# Patient Record
Sex: MALE | Race: BLACK OR AFRICAN AMERICAN | URBAN - METROPOLITAN AREA
[De-identification: names, ages, dates, MRNs, and addresses within clinical notes are randomized per-mention and may not be internally consistent; named-entity substitution may affect disease eponyms.]

---

## 2021-01-21 ENCOUNTER — ANESTHESIA EVENT (OUTPATIENT)
Dept: OPERATING ROOM | Age: 56
End: 2021-01-21
Payer: COMMERCIAL

## 2021-01-21 NOTE — PROGRESS NOTES
Unable to reach pt or leave preop inst despite multiple attempts. Vmsg left with Dr. Arsen Jovel' office informing them of this matter as well as not having any covid results on pt's chart.

## 2021-01-21 NOTE — H&P
[facility]   [facility]ISTORY AND PHYSICAL      I am seeing Eugene Bell at the request of Dr Jr Castano  for evaluation of the patient's medical problems prior to left tailor bunionectomy with fifth metatarsal osteotomy left     1/21/2021 6:11 PM    Patient Information:  Eugene Bell is a 48 y.o. male [id number]  PCP:  Mauricio Christensen (Tel: None     Chief complaint:  I am having foot surgery on my baby toe    History of Present Illness:  Facundo Oden is a 48 y.o. male who presented with foot pain. . Symptom onset was gradual for a time period of several months. The severity is described as moderate. The course of his symptoms over time is recurrent. The symptoms improved with non weight bearing and worsened with weight bearing. The patient's symptom is associated with a bunion. History obtained from patient and record review       REVIEW OF SYSTEMS:   Constitutional:  Negative for fever,chills or night sweats  ENT:  Negative for rhinorrhea, epistaxis, hoarseness, sore throat. Respiratory:   Negative for shortness of breath,wheezing  Cardiovascular:   Negative for  chest pain, palpitations   Gastrointestinal:  Negative for nausea, vomiting, diarrhea  Genitourinary:  Negative for polyuria, dysuria   Hematologic/Lymphatic:  Negative for  bleeding tendency, easy bruising  Musculoskeletal:  Negative for myalgias and arthralgias  Neurologic:  Negative for  confusion,dysarthria. Skin:  Negative for itching,rash  Psychiatric:  Negative for depression,anxiety, agitation. Endocrine:  Negative for polydipsia,polyuria,heat /cold intolerance. Past Medical History:  T2DM  HTN    Past Surgical History:   has no past surgical history on file. Medications:  No current facility-administered medications on file prior to encounter.       Current Outpatient Medications on File Prior to Encounter   Medication Sig Dispense Refill    naproxen (NAPROSYN) 375 MG tablet Take 1 tablet by mouth 2 times daily 12 tablet 0    metFORMIN (GLUCOPHAGE) 1000 MG tablet Take 1,000 mg by mouth 2 times daily (with meals)      aspirin 81 MG tablet Take 81 mg by mouth daily      atorvastatin (LIPITOR) 40 MG tablet Take 40 mg by mouth every evening      lisinopril-hydrochlorothiazide (PRINZIDE;ZESTORETIC) 10-12.5 MG per tablet Take 1 tablet by mouth daily      pioglitazone (ACTOS) 15 MG tablet Take 15 mg by mouth daily         Allergies:  No Known Allergies     Social History:   reports that he has never smoked. He has never used smokeless tobacco. He reports current alcohol use. He reports that he does not use drugs. Family History: Mom with HTN and T2DM  Dad with T2DM    Physical Exam:  BP (!) 148/93   Pulse 65   Temp 97.7 °F (36.5 °C) (Temporal)   Resp 16   Ht 5' 6\" (1.676 m)   Wt 197 lb (89.4 kg)   SpO2 98%   BMI 31.80 kg/m²     General appearance:  Appears comfortable. Well nourished  Eyes: Sclera clear, pupils equal  ENT: Moist mucus membranes, no thrush. Trachea midline. Cardiovascular: Regular rhythm, normal S1, S2. No murmur, gallop, rub. No edema in lower extremities  Respiratory: Clear to auscultation bilaterally, no wheeze, good inspiratory effort  Gastrointestinal: Abdomen soft, non-tender, not distended, normal bowel sounds  Musculoskeletal: No cyanosis in digits, neck supple  Neurology: Cranial nerves grossly intact. Alert and oriented in time, place and person. No speech or motor deficits  Psychiatry: Appropriate affect.  Not agitated  Skin: Warm, dry, normal turgor, no rash    Labs:  CBC:   Lab Results   Component Value Date    WBC 5.8 10/23/2017    RBC 5.09 10/23/2017    HGB 15.7 10/23/2017    HCT 47.4 10/23/2017    MCV 93.0 10/23/2017    MCH 30.8 10/23/2017    MCHC 33.1 10/23/2017    RDW 14.7 10/23/2017     10/23/2017    MPV 7.4 10/23/2017     BMP:    Lab Results   Component Value Date     10/23/2017    K 4.2 10/23/2017     10/23/2017    CO2 24 10/23/2017    BUN 14 10/23/2017    CREATININE 0.8 10/23/2017    CALCIUM 9.3 10/23/2017    GFRAA >60 10/23/2017    LABGLOM >60 10/23/2017    GLUCOSE 93 10/23/2017           Problem List  Bunion  HTN  T2DM      Assessment & Recommendation:     1. Bunion:  For surgery today    2. T2DM:  BG this am is 111    3. HTN:  bp slightly elevated. On ACE/ HCTZ         Patient is medically optimized for surgery. Thank you for the opportunity to participate in the care of your patient.   ARIADNE Weston - CNP    1/22/2021 7:08 AM

## 2021-01-22 ENCOUNTER — ANESTHESIA (OUTPATIENT)
Dept: OPERATING ROOM | Age: 56
End: 2021-01-22
Payer: COMMERCIAL

## 2021-01-22 ENCOUNTER — APPOINTMENT (OUTPATIENT)
Dept: GENERAL RADIOLOGY | Age: 56
End: 2021-01-22
Attending: PODIATRIST
Payer: COMMERCIAL

## 2021-01-22 ENCOUNTER — HOSPITAL ENCOUNTER (OUTPATIENT)
Age: 56
Setting detail: OUTPATIENT SURGERY
Discharge: HOME | End: 2021-01-22
Attending: PODIATRIST | Admitting: PODIATRIST
Payer: COMMERCIAL

## 2021-01-22 VITALS
HEART RATE: 64 BPM | OXYGEN SATURATION: 100 % | WEIGHT: 197 LBS | DIASTOLIC BLOOD PRESSURE: 106 MMHG | TEMPERATURE: 97 F | HEIGHT: 66 IN | RESPIRATION RATE: 16 BRPM | SYSTOLIC BLOOD PRESSURE: 146 MMHG | BODY MASS INDEX: 31.66 KG/M2

## 2021-01-22 VITALS
DIASTOLIC BLOOD PRESSURE: 69 MMHG | RESPIRATION RATE: 11 BRPM | SYSTOLIC BLOOD PRESSURE: 118 MMHG | OXYGEN SATURATION: 100 %

## 2021-01-22 LAB
GLUCOSE BLD-MCNC: 111 MG/DL (ref 70–99)
GLUCOSE BLD-MCNC: 96 MG/DL (ref 70–99)
PERFORMED ON: ABNORMAL
PERFORMED ON: NORMAL

## 2021-01-22 PROCEDURE — 2500000003 HC RX 250 WO HCPCS: Performed by: NURSE ANESTHETIST, CERTIFIED REGISTERED

## 2021-01-22 PROCEDURE — 2580000003 HC RX 258: Performed by: PODIATRIST

## 2021-01-22 PROCEDURE — 7100000010 HC PHASE II RECOVERY - FIRST 15 MIN: Performed by: PODIATRIST

## 2021-01-22 PROCEDURE — 3600000014 HC SURGERY LEVEL 4 ADDTL 15MIN: Performed by: PODIATRIST

## 2021-01-22 PROCEDURE — C1713 ANCHOR/SCREW BN/BN,TIS/BN: HCPCS | Performed by: PODIATRIST

## 2021-01-22 PROCEDURE — 6360000002 HC RX W HCPCS: Performed by: NURSE ANESTHETIST, CERTIFIED REGISTERED

## 2021-01-22 PROCEDURE — 3700000001 HC ADD 15 MINUTES (ANESTHESIA): Performed by: PODIATRIST

## 2021-01-22 PROCEDURE — 6360000002 HC RX W HCPCS: Performed by: PODIATRIST

## 2021-01-22 PROCEDURE — 2709999900 HC NON-CHARGEABLE SUPPLY: Performed by: PODIATRIST

## 2021-01-22 PROCEDURE — 3600000004 HC SURGERY LEVEL 4 BASE: Performed by: PODIATRIST

## 2021-01-22 PROCEDURE — 73620 X-RAY EXAM OF FOOT: CPT

## 2021-01-22 PROCEDURE — 2500000003 HC RX 250 WO HCPCS: Performed by: PODIATRIST

## 2021-01-22 PROCEDURE — 7100000000 HC PACU RECOVERY - FIRST 15 MIN: Performed by: PODIATRIST

## 2021-01-22 PROCEDURE — C9359 IMPLNT,BON VOID FILLER-PUTTY: HCPCS | Performed by: PODIATRIST

## 2021-01-22 PROCEDURE — 2580000003 HC RX 258: Performed by: NURSE ANESTHETIST, CERTIFIED REGISTERED

## 2021-01-22 PROCEDURE — 3700000000 HC ANESTHESIA ATTENDED CARE: Performed by: PODIATRIST

## 2021-01-22 PROCEDURE — 2720000010 HC SURG SUPPLY STERILE: Performed by: PODIATRIST

## 2021-01-22 PROCEDURE — 7100000001 HC PACU RECOVERY - ADDTL 15 MIN: Performed by: PODIATRIST

## 2021-01-22 PROCEDURE — 7100000011 HC PHASE II RECOVERY - ADDTL 15 MIN: Performed by: PODIATRIST

## 2021-01-22 DEVICE — CANNULATED SCREW
Type: IMPLANTABLE DEVICE | Site: FOOT | Status: FUNCTIONAL
Brand: ASNIS

## 2021-01-22 DEVICE — SCREW LK T6 2.0MM L14MM: Type: IMPLANTABLE DEVICE | Site: FOOT | Status: FUNCTIONAL

## 2021-01-22 DEVICE — SCREW BONE LK T6 2.0MM L12MM: Type: IMPLANTABLE DEVICE | Site: FOOT | Status: FUNCTIONAL

## 2021-01-22 DEVICE — GRAFT BNE 5ML DEMIN BNE MTRX PTY INTERGRO: Type: IMPLANTABLE DEVICE | Site: FOOT | Status: FUNCTIONAL

## 2021-01-22 DEVICE — ALLOGRAFT HUM TISS INJ LG 1 CC STRL ALLOGEN AMINON: Type: IMPLANTABLE DEVICE | Site: FOOT | Status: FUNCTIONAL

## 2021-01-22 RX ORDER — SODIUM CHLORIDE 9 MG/ML
INJECTION, SOLUTION INTRAVENOUS CONTINUOUS
Status: DISCONTINUED | OUTPATIENT
Start: 2021-01-22 | End: 2021-01-22 | Stop reason: HOSPADM

## 2021-01-22 RX ORDER — SODIUM CHLORIDE, SODIUM LACTATE, POTASSIUM CHLORIDE, CALCIUM CHLORIDE 600; 310; 30; 20 MG/100ML; MG/100ML; MG/100ML; MG/100ML
INJECTION, SOLUTION INTRAVENOUS CONTINUOUS
Status: DISCONTINUED | OUTPATIENT
Start: 2021-01-22 | End: 2021-01-22 | Stop reason: HOSPADM

## 2021-01-22 RX ORDER — PROPOFOL 10 MG/ML
INJECTION, EMULSION INTRAVENOUS PRN
Status: DISCONTINUED | OUTPATIENT
Start: 2021-01-22 | End: 2021-01-22 | Stop reason: SDUPTHER

## 2021-01-22 RX ORDER — FENTANYL CITRATE 50 UG/ML
INJECTION, SOLUTION INTRAMUSCULAR; INTRAVENOUS PRN
Status: DISCONTINUED | OUTPATIENT
Start: 2021-01-22 | End: 2021-01-22 | Stop reason: SDUPTHER

## 2021-01-22 RX ORDER — SODIUM CHLORIDE 0.9 % (FLUSH) 0.9 %
10 SYRINGE (ML) INJECTION EVERY 12 HOURS SCHEDULED
Status: DISCONTINUED | OUTPATIENT
Start: 2021-01-22 | End: 2021-01-22 | Stop reason: HOSPADM

## 2021-01-22 RX ORDER — ONDANSETRON 2 MG/ML
4 INJECTION INTRAMUSCULAR; INTRAVENOUS
Status: DISCONTINUED | OUTPATIENT
Start: 2021-01-22 | End: 2021-01-22 | Stop reason: HOSPADM

## 2021-01-22 RX ORDER — LIDOCAINE HYDROCHLORIDE 10 MG/ML
1 INJECTION, SOLUTION EPIDURAL; INFILTRATION; INTRACAUDAL; PERINEURAL
Status: DISCONTINUED | OUTPATIENT
Start: 2021-01-22 | End: 2021-01-22 | Stop reason: HOSPADM

## 2021-01-22 RX ORDER — LABETALOL HYDROCHLORIDE 5 MG/ML
5 INJECTION, SOLUTION INTRAVENOUS EVERY 10 MIN PRN
Status: DISCONTINUED | OUTPATIENT
Start: 2021-01-22 | End: 2021-01-22 | Stop reason: HOSPADM

## 2021-01-22 RX ORDER — HYDRALAZINE HYDROCHLORIDE 20 MG/ML
5 INJECTION INTRAMUSCULAR; INTRAVENOUS EVERY 10 MIN PRN
Status: DISCONTINUED | OUTPATIENT
Start: 2021-01-22 | End: 2021-01-22 | Stop reason: HOSPADM

## 2021-01-22 RX ORDER — SODIUM CHLORIDE 9 MG/ML
INJECTION, SOLUTION INTRAVENOUS CONTINUOUS PRN
Status: DISCONTINUED | OUTPATIENT
Start: 2021-01-22 | End: 2021-01-22 | Stop reason: SDUPTHER

## 2021-01-22 RX ORDER — SODIUM CHLORIDE 0.9 % (FLUSH) 0.9 %
10 SYRINGE (ML) INJECTION PRN
Status: DISCONTINUED | OUTPATIENT
Start: 2021-01-22 | End: 2021-01-22 | Stop reason: HOSPADM

## 2021-01-22 RX ORDER — LIDOCAINE HYDROCHLORIDE 20 MG/ML
INJECTION, SOLUTION INFILTRATION; PERINEURAL
Status: COMPLETED | OUTPATIENT
Start: 2021-01-22 | End: 2021-01-22

## 2021-01-22 RX ORDER — OXYCODONE HYDROCHLORIDE AND ACETAMINOPHEN 5; 325 MG/1; MG/1
1 TABLET ORAL
Status: DISCONTINUED | OUTPATIENT
Start: 2021-01-22 | End: 2021-01-22 | Stop reason: HOSPADM

## 2021-01-22 RX ORDER — DEXAMETHASONE SODIUM PHOSPHATE 10 MG/ML
INJECTION, SOLUTION INTRAMUSCULAR; INTRAVENOUS PRN
Status: DISCONTINUED | OUTPATIENT
Start: 2021-01-22 | End: 2021-01-22 | Stop reason: SDUPTHER

## 2021-01-22 RX ORDER — HYDROMORPHONE HCL 110MG/55ML
0.5 PATIENT CONTROLLED ANALGESIA SYRINGE INTRAVENOUS EVERY 5 MIN PRN
Status: DISCONTINUED | OUTPATIENT
Start: 2021-01-22 | End: 2021-01-22 | Stop reason: HOSPADM

## 2021-01-22 RX ORDER — BUPIVACAINE HYDROCHLORIDE 5 MG/ML
INJECTION, SOLUTION EPIDURAL; INTRACAUDAL
Status: COMPLETED | OUTPATIENT
Start: 2021-01-22 | End: 2021-01-22

## 2021-01-22 RX ORDER — LIDOCAINE HYDROCHLORIDE 20 MG/ML
INJECTION, SOLUTION EPIDURAL; INFILTRATION; INTRACAUDAL; PERINEURAL PRN
Status: DISCONTINUED | OUTPATIENT
Start: 2021-01-22 | End: 2021-01-22 | Stop reason: SDUPTHER

## 2021-01-22 RX ORDER — MEPERIDINE HYDROCHLORIDE 25 MG/ML
12.5 INJECTION INTRAMUSCULAR; INTRAVENOUS; SUBCUTANEOUS EVERY 5 MIN PRN
Status: DISCONTINUED | OUTPATIENT
Start: 2021-01-22 | End: 2021-01-22 | Stop reason: HOSPADM

## 2021-01-22 RX ORDER — MIDAZOLAM HYDROCHLORIDE 5 MG/ML
INJECTION INTRAMUSCULAR; INTRAVENOUS PRN
Status: DISCONTINUED | OUTPATIENT
Start: 2021-01-22 | End: 2021-01-22 | Stop reason: SDUPTHER

## 2021-01-22 RX ORDER — ONDANSETRON 2 MG/ML
INJECTION INTRAMUSCULAR; INTRAVENOUS PRN
Status: DISCONTINUED | OUTPATIENT
Start: 2021-01-22 | End: 2021-01-22 | Stop reason: SDUPTHER

## 2021-01-22 RX ORDER — ACETAMINOPHEN 650 MG
TABLET, EXTENDED RELEASE ORAL
Status: COMPLETED | OUTPATIENT
Start: 2021-01-22 | End: 2021-01-22

## 2021-01-22 RX ADMIN — SODIUM CHLORIDE: 9 INJECTION, SOLUTION INTRAVENOUS at 14:38

## 2021-01-22 RX ADMIN — DEXAMETHASONE SODIUM PHOSPHATE 4 MG: 10 INJECTION, SOLUTION INTRAMUSCULAR; INTRAVENOUS at 14:23

## 2021-01-22 RX ADMIN — CEFAZOLIN SODIUM 2000 MG: 10 INJECTION, POWDER, FOR SOLUTION INTRAVENOUS at 13:38

## 2021-01-22 RX ADMIN — LIDOCAINE HYDROCHLORIDE 100 MG: 20 INJECTION, SOLUTION EPIDURAL; INFILTRATION; INTRACAUDAL; PERINEURAL at 14:03

## 2021-01-22 RX ADMIN — FENTANYL CITRATE 50 MCG: 50 INJECTION, SOLUTION INTRAMUSCULAR; INTRAVENOUS at 14:16

## 2021-01-22 RX ADMIN — SODIUM CHLORIDE: 9 INJECTION, SOLUTION INTRAVENOUS at 13:58

## 2021-01-22 RX ADMIN — ONDANSETRON 4 MG: 2 INJECTION INTRAMUSCULAR; INTRAVENOUS at 14:21

## 2021-01-22 RX ADMIN — MIDAZOLAM 2 MG: 5 INJECTION INTRAMUSCULAR; INTRAVENOUS at 13:55

## 2021-01-22 RX ADMIN — PROPOFOL 200 MG: 10 INJECTION, EMULSION INTRAVENOUS at 14:04

## 2021-01-22 RX ADMIN — FENTANYL CITRATE 50 MCG: 50 INJECTION, SOLUTION INTRAMUSCULAR; INTRAVENOUS at 14:01

## 2021-01-22 RX ADMIN — SODIUM CHLORIDE: 9 INJECTION, SOLUTION INTRAVENOUS at 10:50

## 2021-01-22 ASSESSMENT — PULMONARY FUNCTION TESTS
PIF_VALUE: 5
PIF_VALUE: 17
PIF_VALUE: 3
PIF_VALUE: 5
PIF_VALUE: 4
PIF_VALUE: 4
PIF_VALUE: 5
PIF_VALUE: 3
PIF_VALUE: 4
PIF_VALUE: 4
PIF_VALUE: 5
PIF_VALUE: 5
PIF_VALUE: 3
PIF_VALUE: 15
PIF_VALUE: 4
PIF_VALUE: 0
PIF_VALUE: 5
PIF_VALUE: 13
PIF_VALUE: 7
PIF_VALUE: 5
PIF_VALUE: 4
PIF_VALUE: 16
PIF_VALUE: 3
PIF_VALUE: 25
PIF_VALUE: 0
PIF_VALUE: 5
PIF_VALUE: 3
PIF_VALUE: 4
PIF_VALUE: 5
PIF_VALUE: 1
PIF_VALUE: 5
PIF_VALUE: 5
PIF_VALUE: 3
PIF_VALUE: 5
PIF_VALUE: 3
PIF_VALUE: 15
PIF_VALUE: 3
PIF_VALUE: 18
PIF_VALUE: 1
PIF_VALUE: 15
PIF_VALUE: 4
PIF_VALUE: 17
PIF_VALUE: 15
PIF_VALUE: 4

## 2021-01-22 ASSESSMENT — PAIN - FUNCTIONAL ASSESSMENT: PAIN_FUNCTIONAL_ASSESSMENT: 0-10

## 2021-01-22 ASSESSMENT — LIFESTYLE VARIABLES: SMOKING_STATUS: 0

## 2021-01-22 NOTE — PROGRESS NOTES
Dr Rebekah Kelly    Pre op: Tailors bunionectomy 5th metatarsal left foot, Pain, bone spur      Post op same plus soft tissue mass    Procedure:  Tailors bunionectomy with excision soft tissue mass left foot    Anesthesia: General    Complications: None noted    Ebl: less than 10 cc

## 2021-01-22 NOTE — PROGRESS NOTES
Pt assisted with dressing and ambulation into wheel chair to ensure non weight bearing status. Pt VSS, pt respirations even unlabored and adequate on room air. Pt denies left foot pain, or nausea, left foot dressing CDI. Pt discharged to daughter in stable condition. Pt taken to car via wheel chair.

## 2021-01-22 NOTE — ANESTHESIA POSTPROCEDURE EVALUATION
Department of Anesthesiology  Postprocedure Note    Patient: Dominique Pandya  MRN: [id number]  YOB: 1965  Date of evaluation: 1/22/2021  Time:  3:17 PM     Procedure Summary     Date: 01/22/21 Room / Location: [address]    Anesthesia Start: 2485 Anesthesia Stop: 7897    Procedure: LEFT TAILOR BUNIONECTOMY WITH FIFTH METATARSAL OSTEOTOMY (Left Foot) Diagnosis:       (M20.12  HALLUX VALGUS (ACQUIRED) LEFT FOOT)      (S93.332A  OTHER SUBLUXATION OF LEFT FOOT, INITIAL ENCOUNTER)    Surgeons: Katerin Larson DPM Responsible Provider: Lucía Resendiz MD    Anesthesia Type: general ASA Status: 3          Anesthesia Type: general    Najma Phase I: Najma Score: 7    Najma Phase II:      Last vitals: Reviewed and per EMR flowsheets.        Anesthesia Post Evaluation    Patient location during evaluation: PACU  Patient participation: complete - patient participated  Level of consciousness: awake and alert  Airway patency: patent  Nausea & Vomiting: no vomiting and no nausea  Complications: no  Cardiovascular status: hemodynamically stable  Respiratory status: acceptable  Hydration status: stable

## 2021-01-22 NOTE — PROGRESS NOTES
Pt arrived from OR, report from CRNA/RN. Pt had left foot surgery, left foot dressing clean dry and intact, left toes warm, good cap refill and appropriate for ethnicity. Pt responsive to pain upon arrival, oral airway in place.

## 2021-01-22 NOTE — OP NOTE
[place] [id number]                     [facility], [address] [place] [address]                                OPERATIVE REPORT    PATIENT NAME: Simi Mendez                 :        1965  MED REC NO:   [id number]                          ROOM:  ACCOUNT NO:   [de-identified]                           ADMIT DATE: 2021  PROVIDER:     Henry Landis DPM    DATE OF PROCEDURE:  2021    PREOPERATIVE DIAGNOSES:  1. Tailor's bunion deformity fifth metatarsal, left foot. 2.  Pain, left foot. 3.  Bone spur, left foot. POSTOPERATIVE DIAGNOSES:  1. Tailor's bunion deformity fifth metatarsal, left foot. 2.  Pain, left foot. 3.  Bone spur, left foot. 4.  Soft tissue mass. OPERATION PERFORMED:  1. Tailor's bunionectomy with fifth metatarsal osteotomy with internal  fixation, left foot. 2.  Excision soft tissue mass found within the surgical area. SURGEON:  Henry Landis DPM    ANESTHESIA:  General.    COMPLICATION:  None noted. ESTIMATED BLOOD LOSS:  Less than 10 mL. PROCEDURE IN DETAIL:  Under mild sedation, the patient was brought into  the operating room and placed on the operating table in the supine  position. Pneumatic ankle cuff was then placed about the patient's left  ankle 2 cm above the medial and lateral malleoli with excessive cast  padding. The patient was placed under general anesthesia and then local  anesthesia was obtained about the dorsal aspect of the foot about the  mid foot region in a ring block fashion utilizing 10 mL of 2% lidocaine  plain. Foot and lower leg were then scrubbed, prepped, and draped in  the usual aseptic manner. An Esmarch bandage was then utilized to  exsanguinate the patient's left foot. Pneumatic ankle cuff was inflated  to 250 mmHg, left.     Attention was then directed to the dorsal aspect of the fifth MTP joint,  where there was a very large bone spur with Tailor's bunion deformity with a very large swollen joint on the lateral aspect of the left foot. A 6 cm linear longitudinal incision was made lateral and parallel to the  EDL tendon of the fifth toe. Dissection was then continued all the way  down through the subcutaneous tissues with care being taken to identify  and retract all the vital neural and vascular structures. All bleeders  were cauterized and ligated as necessary. Next, dissection was  continued all the way down through the subcutaneous tissues. Dissection  was continued all the way down to this particular region. A 6 cm  incision was made to the periosteal and capsular structures thus  exposing the fifth metatarsal head, neck and shaft area. Please note  there was a severe lateral deviation of the first metatarsal head along  with dislocation in the first MTP joint. X-ray was taken and printed  off at this time to show the severe deformity and dislocation. At this  time, I placed a guidewire at the apex of the deformity which was in the  mid shaft of the metatarsal neck and then we created an osteotomy  beginning in the apex which was on the lateral cortex and it was a V  type osteotomy created from proximal to distal and it was where the  deformity was and we took more bone laterally than medially. We removed  this wedge of bone, removed the wire, then we _____ it down and then we  transposed the capital fragment from the lateral aspect to the medial  aspect, fixated it with a 0.062 K-wire. We then brought in a MiniFrag  2.0 6-hole straight plate and we placed 2.0 x 14 mm locking screw  proximally, 2.0 x 12 mm locking screw in the second position and then in  the very distal position we placed a 2.0 x 12 mm locking screw. Next,  the fragments were then stabilized and then we utilized a 2.0 x 12 mm  headed cannulated screw that we placed from distal lateral to proximal  medial encompassing the osteotomy site for excellent compression.   We left two threads through for excellent compression. Then, we placed a  2.0 x 12 mm final locking screw distally. Excellent reestablishment of  the fifth ray was noted. Excellent re-apposition and position of the  fifth metatarsal was noted. At this time, we did a 2.5 mm wedge  resection of capsular tissue. We sewed this with 3-0 Vicryl medially. Please note that there was still visibility of the osteotomy medially,  so we filled this with bone graft on the medial side and we  cylindrically placed this around the plate to further enhance this due  to the small nature of his bone and a very large nature of the leg and  the foot in the weightbearing position. After this was done, periosteal  and capsular structures sewn utilizing 3-0 Vicryl. Next, small incision  was made in the inferior aspect of the joint. There was a very large 4  cm tissue mass that was very rubbery, which was a bursal sac from the  large deformity that was present. We identified this, dissected out and  completely removed it in total.  Area was then flushed with copious  amounts of sterile normal saline. Subcutaneous tissue was then sewn  with 4-0 Vicryl, skin was then sewn with 3-0 and 4-0 nylon with simple  interrupted suture technique. Upon completion of the procedure, total  of 1 mL of tissue Vivex amnion solution was injected about the  incisional sites. This was dressed with Betadine-soaked Adaptic,  sterile 4 x 4s, 4 x 8s, two rolls of 2-inch Priscilla, several layers of  fluffy cast padding and Coban. Tourniquet had been deflated and a  prompt hyperemic response was noted in all digits 1 through 5 of the  left foot. The patient tolerated the procedure and anesthesia well, transferred to  the recovery room with vital signs stable and vascular status intact to  all digits 1 through 5 of the left foot.   Following a period of  postoperative monitoring, the patient will be discharged to home on oral and written postop instructions per Dr. Henry Landis DPM.  The patient  is to keep the dressing dry, clean and intact, elevate above level of  heart, place ice about this. The patient is to remain strict  nonweightbearing with crutch use. I will see the patient back in the  office for bandage change and recheck in the office. Again, he is to  remain strict nonweightbearing with crutch use. I spoken with patient  and with family, his daughter _____ afterwards and explained the  necessity and the importance of this being nonweightbearing so that  there was not any loosening or change of position of the osteotomy site  and the surgical outcome. The patient understands, agrees and says he  will do this.         Louisa Mendez DPM    D: 01/22/2021 15:25:28       T: 01/22/2021 16:46:28     [id number]  Job#: [id number]     Doc#: [id number]    CC:  Henry Landis DPM

## 2021-01-22 NOTE — ANESTHESIA PRE PROCEDURE
Department of Anesthesiology  Preprocedure Note       Name:  Jeanine Pino   Age:  48 y.o.  :  1965                                          MRN:  [id number]         Date:  2021      Surgeon: Vicente Dutton):  Claudis Apley, DPM    Procedure: Procedure(s):  LEFT TAILOR BUNIONECTOMY WITH FIFTH METATARSAL OSTEOTOMY    Medications prior to admission:   Prior to Admission medications    Medication Sig Start Date End Date Taking?  Authorizing Provider   naproxen (NAPROSYN) 375 MG tablet Take 1 tablet by mouth 2 times daily 10/23/17  Yes Jayden MD Ja   metFORMIN (GLUCOPHAGE) 1000 MG tablet Take 1,000 mg by mouth 2 times daily (with meals)    Historical Provider, MD   aspirin 81 MG tablet Take 81 mg by mouth daily    Historical Provider, MD   atorvastatin (LIPITOR) 40 MG tablet Take 40 mg by mouth every evening    Historical Provider, MD   lisinopril-hydrochlorothiazide (PRINZIDE;ZESTORETIC) 10-12.5 MG per tablet Take 1 tablet by mouth daily    Historical Provider, MD   pioglitazone (ACTOS) 15 MG tablet Take 15 mg by mouth daily    Historical Provider, MD       Current medications:    Current Facility-Administered Medications   Medication Dose Route Frequency Provider Last Rate Last Admin    0.9 % sodium chloride infusion   Intravenous Continuous Elizabeth Levjacquelin DENSON DPSACHA 100 mL/hr at 21 1050 New Bag at 21 1050    meperidine (DEMEROL) injection 12.5 mg  12.5 mg Intravenous Q5 Min PRN Meaghan Cormier MD        HYDROmorphone (DILAUDID) injection 0.5 mg  0.5 mg Intravenous Q5 Min PRN Meaghan Cormier MD        oxyCODONE-acetaminophen (PERCOCET) 5-325 MG per tablet 1 tablet  1 tablet Oral Once PRN Meaghan Cormier MD        ondansetron [facility] injection 4 mg  4 mg Intravenous Once PRN Meaghan Cormier MD        labetalol (NORMODYNE;TRANDATE) injection 5 mg  5 mg Intravenous Q10 Min PRJOSEPHINE Cormier MD  hydrALAZINE (APRESOLINE) injection 5 mg  5 mg Intravenous Q10 Min PRN Mitzi Duffy MD        lactated ringers infusion   Intravenous Continuous Mitzi Duffy MD        sodium chloride flush 0.9 % injection 10 mL  10 mL Intravenous 2 times per day Mitzi Duffy MD        sodium chloride flush 0.9 % injection 10 mL  10 mL Intravenous PRN Mitzi Duffy MD        lidocaine PF 1 % injection 1 mL  1 mL Intradermal Once PRN Mitzi Duffy MD        ceFAZolin (ANCEF) 2000 mg in dextrose 5 % 100 mL IVPB  2,000 mg Intravenous Once Bonnie DENSON DPM           Allergies:  No Known Allergies    Problem List:  There is no problem list on file for this patient. Past Medical History:  History reviewed. No pertinent past medical history. Past Surgical History:  History reviewed. No pertinent surgical history. Social History:    Social History     Tobacco Use    Smoking status: Never Smoker    Smokeless tobacco: Never Used   Substance Use Topics    Alcohol use: Yes     Comment: occas                                Counseling given: Not Answered      Vital Signs (Current):   Vitals:    01/22/21 1043   BP: (!) 148/93   Pulse: 65   Resp: 16   Temp: 97.7 °F (36.5 °C)   TempSrc: Temporal   SpO2: 98%   Weight: 197 lb (89.4 kg)   Height: 5' 6\" (1.676 m)                                              BP Readings from Last 3 Encounters:   01/22/21 (!) 148/93   10/24/17 132/83       NPO Status: Time of last liquid consumption: 0700                        Time of last solid consumption: 2100                        Date of last liquid consumption: 01/22/21                        Date of last solid food consumption: 01/21/21    BMI:   Wt Readings from Last 3 Encounters:   01/22/21 197 lb (89.4 kg)   10/23/17 190 lb 14.7 oz (86.6 kg)     Body mass index is 31.8 kg/m².     CBC:   Lab Results   Component Value Date    WBC 5.8 10/23/2017    RBC 5.09 10/23/2017    HGB 15.7 10/23/2017 Induction: intravenous. MIPS: Postoperative opioids intended and Prophylactic antiemetics administered. Anesthetic plan and risks discussed with patient. Plan discussed with CRNA.                   Andres Rose MD   1/22/2021

## 2021-01-22 NOTE — PROGRESS NOTES
Oral airway taken out per dr Drea Tran. Pt respirations even unlabored, adequate oxygenation on room air, 98%. Left foot elevated, ice pack placed.

## 2021-01-22 NOTE — PROGRESS NOTES
Incentive spirometer given to pt with instruction for use, pt demonstrated understanding. Crutches fitted to pt, pt given instructions for use of crutches and pt able to ambulate small distance with crutches. Pt strict non weight bearing, pt aware. Left foot dressing clean dry and intact, left toes warm, good cap refill, and appropriate for ethnicity.

## 2021-01-22 NOTE — PROGRESS NOTES
Discharge instructions reviewed with Darron Borden (daughter), all questions answered and verbalized understanding of instructions.

## 2022-06-23 ENCOUNTER — OFFICE VISIT (OUTPATIENT)
Dept: PRIMARY CARE CLINIC | Age: 57
End: 2022-06-23
Payer: COMMERCIAL

## 2022-06-23 VITALS
BODY MASS INDEX: 31.41 KG/M2 | RESPIRATION RATE: 18 BRPM | SYSTOLIC BLOOD PRESSURE: 132 MMHG | WEIGHT: 194.6 LBS | HEART RATE: 68 BPM | DIASTOLIC BLOOD PRESSURE: 72 MMHG

## 2022-06-23 DIAGNOSIS — Z76.89 ENCOUNTER TO ESTABLISH CARE: Primary | ICD-10-CM

## 2022-06-23 DIAGNOSIS — E11.9 TYPE 2 DIABETES MELLITUS WITHOUT COMPLICATION, WITHOUT LONG-TERM CURRENT USE OF INSULIN (HCC): ICD-10-CM

## 2022-06-23 DIAGNOSIS — E78.5 HYPERLIPIDEMIA LDL GOAL <100: ICD-10-CM

## 2022-06-23 DIAGNOSIS — I10 ESSENTIAL HYPERTENSION: ICD-10-CM

## 2022-06-23 LAB — HBA1C MFR BLD: 7.1 %

## 2022-06-23 PROCEDURE — 4004F PT TOBACCO SCREEN RCVD TLK: CPT | Performed by: FAMILY MEDICINE

## 2022-06-23 PROCEDURE — G8427 DOCREV CUR MEDS BY ELIG CLIN: HCPCS | Performed by: FAMILY MEDICINE

## 2022-06-23 PROCEDURE — 3046F HEMOGLOBIN A1C LEVEL >9.0%: CPT | Performed by: FAMILY MEDICINE

## 2022-06-23 PROCEDURE — 2022F DILAT RTA XM EVC RTNOPTHY: CPT | Performed by: FAMILY MEDICINE

## 2022-06-23 PROCEDURE — G8417 CALC BMI ABV UP PARAM F/U: HCPCS | Performed by: FAMILY MEDICINE

## 2022-06-23 PROCEDURE — 99204 OFFICE O/P NEW MOD 45 MIN: CPT | Performed by: FAMILY MEDICINE

## 2022-06-23 PROCEDURE — 83036 HEMOGLOBIN GLYCOSYLATED A1C: CPT | Performed by: FAMILY MEDICINE

## 2022-06-23 PROCEDURE — 3017F COLORECTAL CA SCREEN DOC REV: CPT | Performed by: FAMILY MEDICINE

## 2022-06-23 RX ORDER — ATORVASTATIN CALCIUM 40 MG/1
40 TABLET, FILM COATED ORAL EVERY EVENING
Qty: 90 TABLET | Refills: 1 | Status: SHIPPED | OUTPATIENT
Start: 2022-06-23

## 2022-06-23 RX ORDER — PIOGLITAZONEHYDROCHLORIDE 30 MG/1
30 TABLET ORAL DAILY
Qty: 90 TABLET | Refills: 1 | Status: SHIPPED | OUTPATIENT
Start: 2022-06-23

## 2022-06-23 RX ORDER — AMLODIPINE BESYLATE 10 MG/1
10 TABLET ORAL DAILY
Qty: 90 TABLET | Refills: 1 | Status: SHIPPED | OUTPATIENT
Start: 2022-06-23

## 2022-06-23 RX ORDER — AMLODIPINE BESYLATE 10 MG/1
10 TABLET ORAL DAILY
COMMUNITY
Start: 2022-06-08 | End: 2022-06-23 | Stop reason: SDUPTHER

## 2022-06-23 RX ORDER — METFORMIN HYDROCHLORIDE 500 MG/1
500 TABLET, EXTENDED RELEASE ORAL
Qty: 90 TABLET | Refills: 1 | Status: SHIPPED | OUTPATIENT
Start: 2022-06-23

## 2022-06-23 RX ORDER — ASPIRIN 81 MG/1
81 TABLET ORAL DAILY
Qty: 90 TABLET | Refills: 1 | Status: SHIPPED | OUTPATIENT
Start: 2022-06-23

## 2022-06-23 RX ORDER — PIOGLITAZONEHYDROCHLORIDE 30 MG/1
30 TABLET ORAL DAILY
COMMUNITY
Start: 2022-06-08 | End: 2022-06-23 | Stop reason: SDUPTHER

## 2022-06-23 RX ORDER — LISINOPRIL 10 MG/1
10 TABLET ORAL DAILY
Qty: 90 TABLET | Refills: 1 | Status: SHIPPED | OUTPATIENT
Start: 2022-06-23

## 2022-06-23 SDOH — ECONOMIC STABILITY: FOOD INSECURITY: WITHIN THE PAST 12 MONTHS, YOU WORRIED THAT YOUR FOOD WOULD RUN OUT BEFORE YOU GOT MONEY TO BUY MORE.: NEVER TRUE

## 2022-06-23 SDOH — ECONOMIC STABILITY: FOOD INSECURITY: WITHIN THE PAST 12 MONTHS, THE FOOD YOU BOUGHT JUST DIDN'T LAST AND YOU DIDN'T HAVE MONEY TO GET MORE.: NEVER TRUE

## 2022-06-23 ASSESSMENT — ENCOUNTER SYMPTOMS
SHORTNESS OF BREATH: 0
CONSTIPATION: 0
DIARRHEA: 0
ABDOMINAL PAIN: 0
TROUBLE SWALLOWING: 0
BLOOD IN STOOL: 0
VOICE CHANGE: 0

## 2022-06-23 ASSESSMENT — PATIENT HEALTH QUESTIONNAIRE - PHQ9
2. FEELING DOWN, DEPRESSED OR HOPELESS: 1
SUM OF ALL RESPONSES TO PHQ QUESTIONS 1-9: 2
SUM OF ALL RESPONSES TO PHQ9 QUESTIONS 1 & 2: 2
1. LITTLE INTEREST OR PLEASURE IN DOING THINGS: 1

## 2022-06-23 ASSESSMENT — SOCIAL DETERMINANTS OF HEALTH (SDOH): HOW HARD IS IT FOR YOU TO PAY FOR THE VERY BASICS LIKE FOOD, HOUSING, MEDICAL CARE, AND HEATING?: NOT HARD AT ALL

## 2022-06-23 NOTE — PROGRESS NOTES
2022    Filemon Riley (:  1965) is a 65 y.o. male, here for evaluation of the following medical concerns:    HPI  Patient presented to the office to establish care. He has no PCP but goes to [facility] for his medical needs. He has diabetes and takes Actos 30 mg daily, was on metformin but quit medication due to GI side effects. HbA1c today is 7.1%, denies hypoglycemic episodes. He has hyperlipidemia and reported to be compliant with a statin, takes Lipitor 40 mg daily and tolerating medication without side effect. He has hypertension and takes amlodipine 10 mg daily he was also on lisinopril hydrochlorothiazide but did not refill medication. Overall he is doing fairly though has some visual problems that need to be reevaluated, denies chest pain or shortness of breath. Denies bowel or urinary disturbance. 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 HENT: Negative for trouble swallowing and voice change. Eyes: Negative for visual disturbance. Respiratory: Negative for shortness of breath. Cardiovascular: Negative for chest pain and leg swelling. Gastrointestinal: Negative for abdominal pain, blood in stool, constipation and diarrhea. Genitourinary: Negative for difficulty urinating, dysuria, frequency, hematuria and scrotal swelling. Musculoskeletal: Negative for arthralgias and myalgias. Skin: Negative for rash. Neurological: Negative for dizziness. Psychiatric/Behavioral: Negative for behavioral problems. Prior to Visit Medications    Medication Sig Taking?  Authorizing Provider   vitamin D (CHOLECALCIFEROL) 25 MCG (1000 UT) TABS tablet Take 1,000 Units by mouth daily Yes Historical Provider, MD   amLODIPine (NORVASC) 10 MG tablet Take 1 tablet by mouth daily Yes Karina Huffman MD   pioglitazone (ACTOS) 30 MG tablet Take 1 tablet by mouth daily Yes Ana Henson, MD   atorvastatin (LIPITOR) 40 MG tablet Take 1 tablet by mouth every evening Yes Ana Henson MD   lisinopril (PRINIVIL;ZESTRIL) 10 MG tablet Take 1 tablet by mouth daily Yes Ana Henson, MD   aspirin EC 81 MG EC tablet Take 1 tablet by mouth daily Yes Ana Henson MD   metFORMIN (GLUCOPHAGE-XR) 500 MG extended release tablet Take 1 tablet by mouth daily (with breakfast) Yes Ana Henson, MD   aspirin 81 MG tablet Take 81 mg by mouth daily  Patient not taking: Reported on 6/23/2022  Historical Provider, MD        No Known Allergies    Past Medical History:   Diagnosis Date    ADHD (attention deficit hyperactivity disorder)     [facility]     GERD (gastroesophageal reflux disease)     GERD (gastroesophageal reflux disease)     Hyperlipidemia     Hypertension     Left ear hearing loss     Neuropathy     Sleep apnea     Substance abuse ([facility]        Past Surgical History:   Procedure Laterality Date    BUNIONECTOMY Left 1/22/2021    LEFT TAILOR BUNIONECTOMY WITH FIFTH METATARSAL OSTEOTOMY performed by Eboni Light DPM [facility]    WISDOM TOOTH EXTRACTION         Social History     Socioeconomic History    Marital status:      Spouse name: Not on file    Number of children: Not on file    Years of education: Not on file    Highest education level: Not on file   Occupational History    Not on file   Tobacco Use    Smoking status: Current Every Day Smoker     Packs/day: 1.00     Years: 45.00     Pack years: 45.00     Types: Cigarettes    Smokeless tobacco: Never Used   Vaping Use    Vaping Use: Some days    Substances: Flavoring    Devices: Refillable tank   Substance and Sexual Activity    Alcohol use: Yes     Comment: occas    Drug use: Yes     Types: Marijuana [place] Other)    Sexual activity: Yes     Partners: Female   Other Topics Concern    Not on file   Social History Narrative    Not on file     Social Determinants of Health     Financial Resource Strain: Low Risk     Difficulty of Paying Living Expenses: Not hard at all   Food Insecurity: No Food Insecurity    Worried About Running Out of Food in the Last Year: Never true    [address] in the Last Year: Never true   Transportation Needs:     Lack of Transportation (Medical): Not on file    Lack of Transportation (Non-Medical): Not on file   Physical Activity:     Days of Exercise per Week: Not on file    Minutes of Exercise per Session: Not on file   Stress:     Feeling of Stress : Not on file   Social Connections:     Frequency of Communication with Friends and Family: Not on file    Frequency of Social Gatherings with Friends and Family: Not on file    Attends Hindu Services: Not on file    Active Member of [address] [place] [address] [employer] or Organizations: Not on file    Attends Club or Organization Meetings: Not on file    Marital Status: Not on file   Intimate Partner Violence:     Fear of Current or Ex-Partner: Not on file    Emotionally Abused: Not on file    Physically Abused: Not on file    Sexually Abused: Not on file   Housing Stability:     Unable to Pay for Housing in the Last Year: Not on file    Number of [place] in the Last Year: Not on file    Unstable Housing in the Last Year: Not on file        History reviewed. No pertinent family history. Vitals:    06/23/22 1220   BP: 132/72   Pulse: 68   Resp: 18   Weight: 194 lb 9.6 oz (88.3 kg)     Estimated body mass index is 31.41 kg/m² as calculated from the following:    Height as of 1/22/21: 5' 6\" (1.676 m). Weight as of this encounter: 194 lb 9.6 oz (88.3 kg). Physical Exam  Constitutional:       General: He is not in acute distress. Appearance: He is well-developed. HENT:      Head: Normocephalic. Eyes:      Conjunctiva/sclera: Conjunctivae normal.   Neck:      Thyroid: No thyromegaly.    Cardiovascular:      Rate and Rhythm: Normal rate and regular electronic signature was used to authenticate this note.    --Dasia Lamb MD on 6/23/2022 at 12:48 PM

## 2022-07-26 ENCOUNTER — OFFICE VISIT (OUTPATIENT)
Dept: PRIMARY CARE CLINIC | Age: 57
End: 2022-07-26
Payer: COMMERCIAL

## 2022-07-26 VITALS
BODY MASS INDEX: 31.15 KG/M2 | WEIGHT: 193 LBS | DIASTOLIC BLOOD PRESSURE: 84 MMHG | SYSTOLIC BLOOD PRESSURE: 125 MMHG | HEART RATE: 76 BPM | TEMPERATURE: 97.5 F | RESPIRATION RATE: 18 BRPM

## 2022-07-26 DIAGNOSIS — E78.5 HYPERLIPIDEMIA LDL GOAL <100: ICD-10-CM

## 2022-07-26 DIAGNOSIS — J06.9 ACUTE URI: ICD-10-CM

## 2022-07-26 DIAGNOSIS — E11.9 TYPE 2 DIABETES MELLITUS WITHOUT COMPLICATION, WITHOUT LONG-TERM CURRENT USE OF INSULIN (HCC): Primary | ICD-10-CM

## 2022-07-26 DIAGNOSIS — Z11.4 SCREENING FOR HIV WITHOUT PRESENCE OF RISK FACTORS: ICD-10-CM

## 2022-07-26 DIAGNOSIS — Z12.5 SCREENING PSA (PROSTATE SPECIFIC ANTIGEN): ICD-10-CM

## 2022-07-26 DIAGNOSIS — I10 ESSENTIAL HYPERTENSION: ICD-10-CM

## 2022-07-26 DIAGNOSIS — Z11.59 NEED FOR HEPATITIS C SCREENING TEST: ICD-10-CM

## 2022-07-26 LAB
A/G RATIO: 1.6 (ref 1.1–2.2)
ALBUMIN SERPL-MCNC: 4.1 G/DL (ref 3.4–5)
ALP BLD-CCNC: 102 U/L (ref 40–129)
ALT SERPL-CCNC: 15 U/L (ref 10–40)
ANION GAP SERPL CALCULATED.3IONS-SCNC: 15 MMOL/L (ref 3–16)
AST SERPL-CCNC: 13 U/L (ref 15–37)
BASOPHILS ABSOLUTE: 0 K/UL (ref 0–0.2)
BASOPHILS RELATIVE PERCENT: 0.3 %
BILIRUB SERPL-MCNC: <0.2 MG/DL (ref 0–1)
BUN BLDV-MCNC: 12 MG/DL (ref 7–20)
CALCIUM SERPL-MCNC: 9.1 MG/DL (ref 8.3–10.6)
CHLORIDE BLD-SCNC: 104 MMOL/L (ref 99–110)
CHOLESTEROL, FASTING: 147 MG/DL (ref 0–199)
CO2: 26 MMOL/L (ref 21–32)
CREAT SERPL-MCNC: 0.9 MG/DL (ref 0.9–1.3)
EOSINOPHILS ABSOLUTE: 0 K/UL (ref 0–0.6)
EOSINOPHILS RELATIVE PERCENT: 0.6 %
GFR AFRICAN AMERICAN: >60
GFR NON-AFRICAN AMERICAN: >60
GLUCOSE FASTING: 124 MG/DL (ref 70–99)
HCT VFR BLD CALC: 45.9 % (ref 40.5–52.5)
HDLC SERPL-MCNC: 63 MG/DL (ref 40–60)
HEMOGLOBIN: 15 G/DL (ref 13.5–17.5)
LDL CHOLESTEROL CALCULATED: 73 MG/DL
LYMPHOCYTES ABSOLUTE: 2.6 K/UL (ref 1–5.1)
LYMPHOCYTES RELATIVE PERCENT: 35 %
MCH RBC QN AUTO: 30.4 PG (ref 26–34)
MCHC RBC AUTO-ENTMCNC: 32.6 G/DL (ref 31–36)
MCV RBC AUTO: 93.2 FL (ref 80–100)
MONOCYTES ABSOLUTE: 0.4 K/UL (ref 0–1.3)
MONOCYTES RELATIVE PERCENT: 5.7 %
NEUTROPHILS ABSOLUTE: 4.4 K/UL (ref 1.7–7.7)
NEUTROPHILS RELATIVE PERCENT: 58.4 %
PDW BLD-RTO: 14.7 % (ref 12.4–15.4)
PLATELET # BLD: 248 K/UL (ref 135–450)
PMV BLD AUTO: 7.8 FL (ref 5–10.5)
POTASSIUM SERPL-SCNC: 4.7 MMOL/L (ref 3.5–5.1)
PROSTATE SPECIFIC ANTIGEN: 1.14 NG/ML (ref 0–4)
RBC # BLD: 4.92 M/UL (ref 4.2–5.9)
SODIUM BLD-SCNC: 145 MMOL/L (ref 136–145)
T4 FREE: 1.2 NG/DL (ref 0.9–1.8)
TOTAL PROTEIN: 6.6 G/DL (ref 6.4–8.2)
TRIGLYCERIDE, FASTING: 57 MG/DL (ref 0–150)
TSH SERPL DL<=0.05 MIU/L-ACNC: 1.92 UIU/ML (ref 0.27–4.2)
VLDLC SERPL CALC-MCNC: 11 MG/DL
WBC # BLD: 7.5 K/UL (ref 4–11)

## 2022-07-26 PROCEDURE — 3051F HG A1C>EQUAL 7.0%<8.0%: CPT | Performed by: FAMILY MEDICINE

## 2022-07-26 PROCEDURE — 99214 OFFICE O/P EST MOD 30 MIN: CPT | Performed by: FAMILY MEDICINE

## 2022-07-26 PROCEDURE — 36415 COLL VENOUS BLD VENIPUNCTURE: CPT | Performed by: FAMILY MEDICINE

## 2022-07-26 PROCEDURE — G8417 CALC BMI ABV UP PARAM F/U: HCPCS | Performed by: FAMILY MEDICINE

## 2022-07-26 PROCEDURE — 3017F COLORECTAL CA SCREEN DOC REV: CPT | Performed by: FAMILY MEDICINE

## 2022-07-26 PROCEDURE — 4004F PT TOBACCO SCREEN RCVD TLK: CPT | Performed by: FAMILY MEDICINE

## 2022-07-26 PROCEDURE — G8427 DOCREV CUR MEDS BY ELIG CLIN: HCPCS | Performed by: FAMILY MEDICINE

## 2022-07-26 PROCEDURE — 2022F DILAT RTA XM EVC RTNOPTHY: CPT | Performed by: FAMILY MEDICINE

## 2022-07-26 RX ORDER — AZITHROMYCIN 250 MG/1
TABLET, FILM COATED ORAL
COMMUNITY
Start: 2022-07-22 | End: 2022-10-26

## 2022-07-26 ASSESSMENT — ENCOUNTER SYMPTOMS
ABDOMINAL PAIN: 0
BLOOD IN STOOL: 0
SHORTNESS OF BREATH: 0
VOICE CHANGE: 0
COUGH: 1
TROUBLE SWALLOWING: 0
CONSTIPATION: 0
DIARRHEA: 0

## 2022-07-26 NOTE — PROGRESS NOTES
2022     Nino Ahuja (:  1965) is a 65 y.o. male, here for evaluation of the following medical concerns:    Diabetes  Pertinent negatives for hypoglycemia include no dizziness. Pertinent negatives for diabetes include no chest pain. Patient is new to the practice and back to the office for follow-up and blood tests. He has diabetes was on Actos 30 mg daily, did not tolerate regular metformin due to GI side effects, was prescribed metformin  mg daily which seems she is able to tolerate, denies hypoglycemic episode. He has hyperlipidemia and takes Lipitor 40 mg daily tolerating medication without side effect. He has hypertension controlled with amlodipine 10 mg daily and lisinopril 10 mg daily. Overall patient is doing fairly well except for recent respiratory infection with cough and colds, was seen at the urgent care, tested negative for COVID and was prescribed Z-[place]. Denies fever and chills. Denies chest pain or shortness of breath, denies bowel or urinary disturbance. Review of Systems   Constitutional:  Negative for activity change, chills and fever. HENT:  Positive for congestion. Negative for trouble swallowing and voice change. Eyes:  Negative for visual disturbance. Respiratory:  Positive for cough. Negative for shortness of breath. Cardiovascular:  Negative for chest pain and leg swelling. Gastrointestinal:  Negative for abdominal pain, blood in stool, constipation and diarrhea. Genitourinary:  Negative for difficulty urinating, dysuria, frequency, hematuria and scrotal swelling. Musculoskeletal:  Negative for arthralgias and myalgias. Skin:  Negative for rash. Neurological:  Negative for dizziness. Psychiatric/Behavioral:  Negative for behavioral problems. Prior to Visit Medications    Medication Sig Taking?  Authorizing Provider   vitamin D (CHOLECALCIFEROL) 25 MCG (1000 UT) TABS tablet Take 1,000 Units by mouth daily Yes Historical Provider, MD   amLODIPine (NORVASC) 10 MG tablet Take 1 tablet by mouth daily Yes Kev Atwood MD   pioglitazone (ACTOS) 30 MG tablet Take 1 tablet by mouth daily Yes Kev Atwood MD   atorvastatin (LIPITOR) 40 MG tablet Take 1 tablet by mouth every evening Yes Kev Atwood MD   lisinopril (PRINIVIL;ZESTRIL) 10 MG tablet Take 1 tablet by mouth daily Yes Kev Atwood MD   aspirin EC 81 MG EC tablet Take 1 tablet by mouth daily Yes Kev Atwood MD   metFORMIN (GLUCOPHAGE-XR) 500 MG extended release tablet Take 1 tablet by mouth daily (with breakfast) Yes Kev Atwood MD   azithromycin (ZITHROMAX) 250 MG tablet TAKE 2 TABLETS BY MOUTH ON DAY 1, AND THEN TAKE 1 TABLET BY MOUTH ONCE A DAY ON DAY 2 THROUGH DAY 5  Historical Provider, MD        Social History     Tobacco Use    Smoking status: Every Day     Packs/day: 1.00     Years: 45.00     Pack years: 45.00     Types: Cigarettes    Smokeless tobacco: Never   Substance Use Topics    Alcohol use: Yes     Comment: occas        Vitals:    07/26/22 0909   BP: 125/84   Pulse: 76   Resp: 18   Temp: 97.5 °F (36.4 °C)   TempSrc: Temporal   Weight: 193 lb (87.5 kg)     Estimated body mass index is 31.15 kg/m² as calculated from the following:    Height as of 1/22/21: 5' 6\" (1.676 m). Weight as of this encounter: 193 lb (87.5 kg). Physical Exam  Constitutional:       Appearance: He is well-developed. HENT:      Head: Normocephalic. Eyes:      Conjunctiva/sclera: Conjunctivae normal.      Pupils: Pupils are equal, round, and reactive to light. Neck:      Thyroid: No thyromegaly. Cardiovascular:      Rate and Rhythm: Normal rate and regular rhythm. Heart sounds: Normal heart sounds. No murmur heard. Pulmonary:      Effort: Pulmonary effort is normal.      Breath sounds: Normal breath sounds. Abdominal:      General: Bowel sounds are normal.      Palpations: Abdomen is soft. There is no mass.    Genitourinary:     Penis: Normal. Prostate: Normal.   Musculoskeletal:         General: Normal range of motion. Cervical back: Normal range of motion and neck supple. Skin:     General: Skin is warm. Findings: No rash. Neurological:      Mental Status: He is alert. Psychiatric:         Behavior: Behavior normal.       ASSESSMENT/PLAN:  1. Type 2 diabetes mellitus without complication, without long-term current use of insulin (HCC)  Controlled. Continue current meds. Metformin  mg daily and Actos  30 mg daily    - Comprehensive Metabolic Panel, Fasting    2. Essential hypertension  Controlled. Continue current meds. Amlodipine 10 mg daily and lisinopril 10 mg daily  - CBC with Auto Differential    3. Hyperlipidemia LDL goal <100  Continue Lipitor 40 mg daily. Will check lipid panel today  - T4, Free  - TSH  - Lipid, Fasting    4. Acute URI  Recent URI, seen at urgent care, negative Covid, prescribed Z -pack    5. Screening PSA (prostate specific antigen)  - PSA Screening    6. Screening for HIV without presence of risk factors  - HIV Screen    7. Need for hepatitis C screening test  - Hepatitis C Antibody      RTC in 3 mos.     An electronic signature was used to authenticate this note.    --Vy White MD on 7/26/2022 at 9:40 AM

## 2022-07-27 LAB
HEPATITIS C ANTIBODY INTERPRETATION: NORMAL
HIV AG/AB: NORMAL
HIV ANTIGEN: NORMAL
HIV-1 ANTIBODY: NORMAL
HIV-2 AB: NORMAL

## 2022-08-02 ENCOUNTER — TELEPHONE (OUTPATIENT)
Dept: PRIMARY CARE CLINIC | Age: 57
End: 2022-08-02

## 2022-08-02 NOTE — TELEPHONE ENCOUNTER
Rep calling about the status of faxed order sent for new diabetic meter, lancets, test strips & alcohol pads, please advise provider of status at [phone]

## 2022-10-26 ENCOUNTER — OFFICE VISIT (OUTPATIENT)
Dept: PRIMARY CARE CLINIC | Age: 57
End: 2022-10-26
Payer: COMMERCIAL

## 2022-10-26 VITALS
SYSTOLIC BLOOD PRESSURE: 126 MMHG | DIASTOLIC BLOOD PRESSURE: 79 MMHG | BODY MASS INDEX: 31.31 KG/M2 | RESPIRATION RATE: 18 BRPM | WEIGHT: 194 LBS | HEART RATE: 91 BPM | TEMPERATURE: 97.9 F

## 2022-10-26 DIAGNOSIS — E11.9 TYPE 2 DIABETES MELLITUS WITHOUT COMPLICATION, WITHOUT LONG-TERM CURRENT USE OF INSULIN (HCC): Primary | ICD-10-CM

## 2022-10-26 DIAGNOSIS — Z72.0 NICOTINE ABUSE: ICD-10-CM

## 2022-10-26 DIAGNOSIS — E78.5 HYPERLIPIDEMIA LDL GOAL <100: ICD-10-CM

## 2022-10-26 DIAGNOSIS — Z23 NEED FOR INFLUENZA VACCINATION: ICD-10-CM

## 2022-10-26 DIAGNOSIS — I10 ESSENTIAL HYPERTENSION: ICD-10-CM

## 2022-10-26 LAB — HBA1C MFR BLD: 6.6 %

## 2022-10-26 PROCEDURE — 90674 CCIIV4 VAC NO PRSV 0.5 ML IM: CPT | Performed by: FAMILY MEDICINE

## 2022-10-26 PROCEDURE — 4004F PT TOBACCO SCREEN RCVD TLK: CPT | Performed by: FAMILY MEDICINE

## 2022-10-26 PROCEDURE — G8482 FLU IMMUNIZE ORDER/ADMIN: HCPCS | Performed by: FAMILY MEDICINE

## 2022-10-26 PROCEDURE — G8417 CALC BMI ABV UP PARAM F/U: HCPCS | Performed by: FAMILY MEDICINE

## 2022-10-26 PROCEDURE — 2022F DILAT RTA XM EVC RTNOPTHY: CPT | Performed by: FAMILY MEDICINE

## 2022-10-26 PROCEDURE — G8427 DOCREV CUR MEDS BY ELIG CLIN: HCPCS | Performed by: FAMILY MEDICINE

## 2022-10-26 PROCEDURE — 83036 HEMOGLOBIN GLYCOSYLATED A1C: CPT | Performed by: FAMILY MEDICINE

## 2022-10-26 PROCEDURE — 3017F COLORECTAL CA SCREEN DOC REV: CPT | Performed by: FAMILY MEDICINE

## 2022-10-26 PROCEDURE — 3074F SYST BP LT 130 MM HG: CPT | Performed by: FAMILY MEDICINE

## 2022-10-26 PROCEDURE — 3078F DIAST BP <80 MM HG: CPT | Performed by: FAMILY MEDICINE

## 2022-10-26 PROCEDURE — 99214 OFFICE O/P EST MOD 30 MIN: CPT | Performed by: FAMILY MEDICINE

## 2022-10-26 PROCEDURE — 3044F HG A1C LEVEL LT 7.0%: CPT | Performed by: FAMILY MEDICINE

## 2022-10-26 ASSESSMENT — ENCOUNTER SYMPTOMS
VOICE CHANGE: 0
ABDOMINAL PAIN: 0
BLOOD IN STOOL: 0
TROUBLE SWALLOWING: 0
CONSTIPATION: 0
DIARRHEA: 0
SHORTNESS OF BREATH: 0

## 2022-10-26 NOTE — PROGRESS NOTES
10/26/2022     Mandi Townsend (:  1965) is a 65 y.o. male, here for evaluation of the following medical concerns:  Patient presented to the office for regular follow-up. He has diabetes controlled HbA1c today 6.6%, takes Actos 30 mg daily and metformin  mg daily, denies hypoglycemic episode. He has hypertension controlled with amlodipine 10 mg daily and lisinopril 10 mg daily. He has hyperlipidemia reported to be compliant with a statin, takes Lipitor 40 mg daily and tolerating medication without side effect. He denies headache nausea vomiting denies chest pain or shortness of breath. Denies bowel or urinary disturbance. Review of Systems   Constitutional:  Negative for activity change. HENT:  Negative for trouble swallowing and voice change. Eyes:  Negative for visual disturbance. Respiratory:  Negative for shortness of breath. Cardiovascular:  Negative for chest pain and leg swelling. Gastrointestinal:  Negative for abdominal pain, blood in stool, constipation and diarrhea. Genitourinary:  Negative for difficulty urinating, dysuria, frequency, hematuria and scrotal swelling. Musculoskeletal:  Negative for arthralgias and myalgias. Skin:  Negative for rash. Neurological:  Negative for dizziness. Psychiatric/Behavioral:  Negative for behavioral problems. Prior to Visit Medications    Medication Sig Taking?  Authorizing Provider   vitamin D (CHOLECALCIFEROL) 25 MCG (1000 UT) TABS tablet Take 1,000 Units by mouth daily Yes Historical Provider, MD   amLODIPine (NORVASC) 10 MG tablet Take 1 tablet by mouth daily Yes Issa Castillo MD   pioglitazone (ACTOS) 30 MG tablet Take 1 tablet by mouth daily Yes Issa Castillo MD   atorvastatin (LIPITOR) 40 MG tablet Take 1 tablet by mouth every evening Yes Issa Castillo MD   lisinopril (PRINIVIL;ZESTRIL) 10 MG tablet Take 1 tablet by mouth daily Yes Issa Castillo MD   aspirin EC 81 MG EC tablet Take 1 tablet by mouth daily Yes Mimi Rocha MD   metFORMIN (GLUCOPHAGE-XR) 500 MG extended release tablet Take 1 tablet by mouth daily (with breakfast) Yes Mimi Rocha MD        Social History     Tobacco Use    Smoking status: Every Day     Packs/day: 1.00     Years: 45.00     Pack years: 45.00     Types: Cigarettes    Smokeless tobacco: Never   Substance Use Topics    Alcohol use: Yes     Comment: occas        Vitals:    10/26/22 1657   BP: 126/79   Pulse: 91   Resp: 18   Temp: 97.9 °F (36.6 °C)   TempSrc: Temporal   Weight: 194 lb (88 kg)     Estimated body mass index is 31.31 kg/m² as calculated from the following:    Height as of 1/22/21: 5' 6\" (1.676 m). Weight as of this encounter: 194 lb (88 kg). Physical Exam  Constitutional:       General: He is not in acute distress. Appearance: He is well-developed. HENT:      Head: Normocephalic. Eyes:      Conjunctiva/sclera: Conjunctivae normal.   Neck:      Thyroid: No thyromegaly. Cardiovascular:      Rate and Rhythm: Normal rate and regular rhythm. Heart sounds: Normal heart sounds. No murmur heard. Pulmonary:      Effort: No respiratory distress. Breath sounds: Normal breath sounds. No wheezing or rales. Abdominal:      General: There is no distension. Palpations: Abdomen is soft. There is no mass. Tenderness: There is no guarding or rebound. Musculoskeletal:         General: Normal range of motion. Cervical back: Neck supple. Skin:     General: Skin is warm. Findings: No rash. Neurological:      Mental Status: He is alert and oriented to person, place, and time. Psychiatric:         Behavior: Behavior normal.       ASSESSMENT/PLAN:  1. Type 2 diabetes mellitus without complication, without long-term current use of insulin (HCC)  Controlled. HbA1c today 6.6%. Continue Actos 30 mg daily and metformin  mg daily    2. Essential hypertension  Controlled.   Continue amlodipine 10 mg daily and lisinopril 10 mg daily    3. Hyperlipidemia LDL goal <100  Controlled. Continue Lipitor 40 mg daily plus aspirin 81 mg daily    4.  Need for influenza vaccination        RTC in 6 mos    An electronic signature was used to authenticate this note.    --Joana Foss MD on 10/26/2022 at 5:28 PM

## 2023-03-31 DIAGNOSIS — I10 ESSENTIAL HYPERTENSION: ICD-10-CM

## 2023-03-31 DIAGNOSIS — E11.9 TYPE 2 DIABETES MELLITUS WITHOUT COMPLICATION, WITHOUT LONG-TERM CURRENT USE OF INSULIN (HCC): ICD-10-CM

## 2023-03-31 DIAGNOSIS — E78.5 HYPERLIPIDEMIA LDL GOAL <100: ICD-10-CM

## 2023-03-31 RX ORDER — ATORVASTATIN CALCIUM 40 MG/1
40 TABLET, FILM COATED ORAL EVERY EVENING
Qty: 90 TABLET | Refills: 1 | Status: SHIPPED | OUTPATIENT
Start: 2023-03-31

## 2023-03-31 RX ORDER — ASPIRIN 81 MG/1
81 TABLET ORAL DAILY
Qty: 90 TABLET | Refills: 1 | Status: SHIPPED | OUTPATIENT
Start: 2023-03-31

## 2023-03-31 RX ORDER — AMLODIPINE BESYLATE 10 MG/1
10 TABLET ORAL DAILY
Qty: 90 TABLET | Refills: 1 | Status: SHIPPED | OUTPATIENT
Start: 2023-03-31

## 2023-03-31 RX ORDER — LISINOPRIL 10 MG/1
10 TABLET ORAL DAILY
Qty: 90 TABLET | Refills: 1 | Status: SHIPPED | OUTPATIENT
Start: 2023-03-31

## 2023-03-31 RX ORDER — PIOGLITAZONEHYDROCHLORIDE 30 MG/1
30 TABLET ORAL DAILY
Qty: 90 TABLET | Refills: 1 | Status: SHIPPED | OUTPATIENT
Start: 2023-03-31

## 2023-03-31 RX ORDER — METFORMIN HYDROCHLORIDE 500 MG/1
500 TABLET, EXTENDED RELEASE ORAL
Qty: 90 TABLET | Refills: 1 | Status: SHIPPED | OUTPATIENT
Start: 2023-03-31

## 2023-04-18 ENCOUNTER — OFFICE VISIT (OUTPATIENT)
Dept: PRIMARY CARE CLINIC | Age: 58
End: 2023-04-18
Payer: COMMERCIAL

## 2023-04-18 VITALS
WEIGHT: 191 LBS | BODY MASS INDEX: 30.83 KG/M2 | OXYGEN SATURATION: 96 % | SYSTOLIC BLOOD PRESSURE: 129 MMHG | DIASTOLIC BLOOD PRESSURE: 82 MMHG | TEMPERATURE: 97.5 F | RESPIRATION RATE: 18 BRPM | HEART RATE: 68 BPM

## 2023-04-18 DIAGNOSIS — E11.9 TYPE 2 DIABETES MELLITUS WITHOUT COMPLICATION, WITHOUT LONG-TERM CURRENT USE OF INSULIN (HCC): Primary | ICD-10-CM

## 2023-04-18 DIAGNOSIS — E78.5 HYPERLIPIDEMIA LDL GOAL <100: ICD-10-CM

## 2023-04-18 DIAGNOSIS — I10 ESSENTIAL HYPERTENSION: ICD-10-CM

## 2023-04-18 DIAGNOSIS — Z72.0 NICOTINE ABUSE: ICD-10-CM

## 2023-04-18 LAB — HBA1C MFR BLD: 6.7 %

## 2023-04-18 PROCEDURE — 3044F HG A1C LEVEL LT 7.0%: CPT | Performed by: FAMILY MEDICINE

## 2023-04-18 PROCEDURE — 3017F COLORECTAL CA SCREEN DOC REV: CPT | Performed by: FAMILY MEDICINE

## 2023-04-18 PROCEDURE — 83036 HEMOGLOBIN GLYCOSYLATED A1C: CPT | Performed by: FAMILY MEDICINE

## 2023-04-18 PROCEDURE — 2022F DILAT RTA XM EVC RTNOPTHY: CPT | Performed by: FAMILY MEDICINE

## 2023-04-18 PROCEDURE — 3074F SYST BP LT 130 MM HG: CPT | Performed by: FAMILY MEDICINE

## 2023-04-18 PROCEDURE — G8427 DOCREV CUR MEDS BY ELIG CLIN: HCPCS | Performed by: FAMILY MEDICINE

## 2023-04-18 PROCEDURE — 99214 OFFICE O/P EST MOD 30 MIN: CPT | Performed by: FAMILY MEDICINE

## 2023-04-18 PROCEDURE — G8417 CALC BMI ABV UP PARAM F/U: HCPCS | Performed by: FAMILY MEDICINE

## 2023-04-18 PROCEDURE — 3079F DIAST BP 80-89 MM HG: CPT | Performed by: FAMILY MEDICINE

## 2023-04-18 PROCEDURE — 4004F PT TOBACCO SCREEN RCVD TLK: CPT | Performed by: FAMILY MEDICINE

## 2023-04-18 RX ORDER — VARENICLINE TARTRATE 1 MG/1
1 TABLET, FILM COATED ORAL 2 TIMES DAILY
Qty: 60 TABLET | Refills: 5 | Status: SHIPPED | OUTPATIENT
Start: 2023-04-18

## 2023-04-18 ASSESSMENT — ENCOUNTER SYMPTOMS
TROUBLE SWALLOWING: 0
BLOOD IN STOOL: 0
CONSTIPATION: 0
SHORTNESS OF BREATH: 0
ABDOMINAL PAIN: 0
VOICE CHANGE: 0
DIARRHEA: 0

## 2023-04-18 ASSESSMENT — PATIENT HEALTH QUESTIONNAIRE - PHQ9
1. LITTLE INTEREST OR PLEASURE IN DOING THINGS: 0
SUM OF ALL RESPONSES TO PHQ QUESTIONS 1-9: 0
SUM OF ALL RESPONSES TO PHQ9 QUESTIONS 1 & 2: 0
2. FEELING DOWN, DEPRESSED OR HOPELESS: 0
SUM OF ALL RESPONSES TO PHQ QUESTIONS 1-9: 0

## 2023-04-18 NOTE — PROGRESS NOTES
tablet by mouth daily Yes Michelle Aguilar MD   aspirin EC 81 MG EC tablet Take 1 tablet by mouth daily Yes Michelle Aguilar MD   metFORMIN (GLUCOPHAGE-XR) 500 MG extended release tablet Take 1 tablet by mouth daily (with breakfast) Yes Michelle Aguilar MD   vitamin D (CHOLECALCIFEROL) 25 MCG (1000 UT) TABS tablet Take 1 tablet by mouth daily Yes Michelle Aguilar MD        Social History     Tobacco Use    Smoking status: Every Day     Packs/day: 1.00     Years: 45.00     Pack years: 45.00     Types: Cigarettes    Smokeless tobacco: Never   Substance Use Topics    Alcohol use: Yes     Comment: occas        Vitals:    04/18/23 1641   BP: 129/82   Pulse: 68   Resp: 18   Temp: 97.5 °F (36.4 °C)   TempSrc: Temporal   SpO2: 96%   Weight: 191 lb (86.6 kg)     Estimated body mass index is 30.83 kg/m² as calculated from the following:    Height as of 1/22/21: 5' 6\" (1.676 m). Weight as of this encounter: 191 lb (86.6 kg). Physical Exam  Constitutional:       General: He is not in acute distress. Appearance: He is well-developed. HENT:      Head: Normocephalic. Eyes:      Conjunctiva/sclera: Conjunctivae normal.   Neck:      Thyroid: No thyromegaly. Cardiovascular:      Rate and Rhythm: Normal rate and regular rhythm. Heart sounds: Normal heart sounds. No murmur heard. Pulmonary:      Effort: No respiratory distress. Breath sounds: Normal breath sounds. No wheezing or rales. Abdominal:      General: There is no distension. Palpations: Abdomen is soft. There is no mass. Tenderness: There is no guarding or rebound. Musculoskeletal:         General: Normal range of motion. Cervical back: Neck supple. Skin:     General: Skin is warm. Findings: No rash. Neurological:      Mental Status: He is alert and oriented to person, place, and time. Psychiatric:         Behavior: Behavior normal.       ASSESSMENT/PLAN:  1.  Type 2 diabetes mellitus without complication, without

## 2023-09-19 DIAGNOSIS — E11.9 TYPE 2 DIABETES MELLITUS WITHOUT COMPLICATION, WITHOUT LONG-TERM CURRENT USE OF INSULIN (HCC): Primary | ICD-10-CM

## 2023-09-19 DIAGNOSIS — E78.5 HYPERLIPIDEMIA LDL GOAL <100: ICD-10-CM

## 2023-09-19 DIAGNOSIS — I10 ESSENTIAL HYPERTENSION: ICD-10-CM

## 2023-09-19 DIAGNOSIS — E11.9 TYPE 2 DIABETES MELLITUS WITHOUT COMPLICATION, WITHOUT LONG-TERM CURRENT USE OF INSULIN (HCC): ICD-10-CM

## 2023-09-19 RX ORDER — METFORMIN HYDROCHLORIDE 500 MG/1
500 TABLET, EXTENDED RELEASE ORAL
Qty: 90 TABLET | Refills: 0 | Status: SHIPPED | OUTPATIENT
Start: 2023-09-19

## 2023-09-19 RX ORDER — BLOOD SUGAR DIAGNOSTIC
STRIP MISCELLANEOUS
Qty: 100 STRIP | Refills: 1 | Status: SHIPPED | OUTPATIENT
Start: 2023-09-19

## 2023-09-19 RX ORDER — LANCING DEVICE
EACH MISCELLANEOUS
Qty: 1 EACH | Refills: 0 | Status: SHIPPED | OUTPATIENT
Start: 2023-09-19

## 2023-09-19 RX ORDER — LISINOPRIL 10 MG/1
10 TABLET ORAL DAILY
Qty: 90 TABLET | Refills: 0 | Status: SHIPPED | OUTPATIENT
Start: 2023-09-19

## 2023-09-19 RX ORDER — BLOOD-GLUCOSE METER
KIT MISCELLANEOUS
Qty: 1 KIT | Refills: 0 | Status: SHIPPED | OUTPATIENT
Start: 2023-09-19

## 2023-09-19 RX ORDER — ISOPROPYL ALCOHOL 0.7 ML/ML
SWAB TOPICAL
Qty: 100 EACH | Refills: 1 | Status: SHIPPED | OUTPATIENT
Start: 2023-09-19

## 2023-09-19 RX ORDER — BLOOD-GLUCOSE CONTROL, NORMAL
EACH MISCELLANEOUS
Qty: 1 EACH | Refills: 3 | Status: SHIPPED | OUTPATIENT
Start: 2023-09-19

## 2023-09-19 RX ORDER — ATORVASTATIN CALCIUM 40 MG/1
40 TABLET, FILM COATED ORAL DAILY
Qty: 90 TABLET | Refills: 0 | Status: SHIPPED | OUTPATIENT
Start: 2023-09-19

## 2023-09-19 RX ORDER — GLUCOSAM/CHON-MSM1/C/MANG/BOSW 500-416.6
TABLET ORAL
Qty: 100 EACH | Refills: 1 | Status: SHIPPED | OUTPATIENT
Start: 2023-09-19

## 2024-07-17 DIAGNOSIS — Z72.0 NICOTINE ABUSE: ICD-10-CM

## 2024-07-17 RX ORDER — VARENICLINE TARTRATE 1 MG/1
1 TABLET, FILM COATED ORAL 2 TIMES DAILY
Qty: 60 TABLET | Refills: 0 | OUTPATIENT
Start: 2024-07-17

## 2024-07-17 NOTE — TELEPHONE ENCOUNTER
Last OV:  4/18/23      Has either cancelled or no-showed last 4 appt's.     No future appts listed.     Hx of DM.     Has to come in for appt for any further refills.

## 2024-11-06 ENCOUNTER — OFFICE VISIT (OUTPATIENT)
Dept: PRIMARY CARE CLINIC | Age: 59
End: 2024-11-06

## 2024-11-06 VITALS
BODY MASS INDEX: 29.92 KG/M2 | WEIGHT: 185.4 LBS | HEART RATE: 60 BPM | DIASTOLIC BLOOD PRESSURE: 72 MMHG | OXYGEN SATURATION: 96 % | RESPIRATION RATE: 18 BRPM | SYSTOLIC BLOOD PRESSURE: 128 MMHG

## 2024-11-06 DIAGNOSIS — E78.5 HYPERLIPIDEMIA LDL GOAL <100: ICD-10-CM

## 2024-11-06 DIAGNOSIS — R20.0 NUMBNESS AND TINGLING IN RIGHT HAND: Primary | ICD-10-CM

## 2024-11-06 DIAGNOSIS — I10 ESSENTIAL HYPERTENSION: ICD-10-CM

## 2024-11-06 DIAGNOSIS — R20.2 NUMBNESS AND TINGLING IN RIGHT HAND: Primary | ICD-10-CM

## 2024-11-06 DIAGNOSIS — E11.9 TYPE 2 DIABETES MELLITUS WITHOUT COMPLICATION, WITHOUT LONG-TERM CURRENT USE OF INSULIN (HCC): ICD-10-CM

## 2024-11-06 PROCEDURE — 99214 OFFICE O/P EST MOD 30 MIN: CPT | Performed by: FAMILY MEDICINE

## 2024-11-06 PROCEDURE — 3074F SYST BP LT 130 MM HG: CPT | Performed by: FAMILY MEDICINE

## 2024-11-06 PROCEDURE — 3078F DIAST BP <80 MM HG: CPT | Performed by: FAMILY MEDICINE

## 2024-11-06 ASSESSMENT — PATIENT HEALTH QUESTIONNAIRE - PHQ9
2. FEELING DOWN, DEPRESSED OR HOPELESS: NOT AT ALL
SUM OF ALL RESPONSES TO PHQ QUESTIONS 1-9: 0
SUM OF ALL RESPONSES TO PHQ QUESTIONS 1-9: 0
1. LITTLE INTEREST OR PLEASURE IN DOING THINGS: NOT AT ALL
SUM OF ALL RESPONSES TO PHQ QUESTIONS 1-9: 0
SUM OF ALL RESPONSES TO PHQ9 QUESTIONS 1 & 2: 0
SUM OF ALL RESPONSES TO PHQ QUESTIONS 1-9: 0

## 2024-11-06 ASSESSMENT — ENCOUNTER SYMPTOMS
VOICE CHANGE: 0
CONSTIPATION: 0
SHORTNESS OF BREATH: 0
BLOOD IN STOOL: 0
TROUBLE SWALLOWING: 0
DIARRHEA: 0
ABDOMINAL PAIN: 0

## 2024-11-06 NOTE — PROGRESS NOTES
2024     Chris Hook (:  1965) is a 59 y.o. male, here for evaluation of the following medical concerns:      Patient is 59 years old male past been seen since last year 2023 and decided to go to provider at a  clinic in [facility].  He has hypertension and takes amlodipine and lisinopril.  She also have diabetes and takes metformin  mg daily.  He has hyperlipidemia and takes Lipitor 40 mg daily.  He smoke 1 pack/day.  He denies shortness of breath or wheezing.  He presented to the office today complaining of numbing pain of the right thumb and right index finger.  He reported that he is doing repetitive work as a  and as a .  He denies neck pain or shoulder pain.    Review of Systems   Constitutional:  Negative for activity change.   HENT:  Negative for trouble swallowing and voice change.    Eyes:  Negative for visual disturbance.   Respiratory:  Negative for shortness of breath.    Cardiovascular:  Negative for chest pain and leg swelling.   Gastrointestinal:  Negative for abdominal pain, blood in stool, constipation and diarrhea.   Genitourinary:  Negative for difficulty urinating, dysuria, frequency, hematuria and scrotal swelling.   Musculoskeletal:  Negative for arthralgias and myalgias.   Skin:  Negative for rash.   Neurological:  Positive for numbness. Negative for dizziness.   Psychiatric/Behavioral:  Negative for behavioral problems.        Prior to Visit Medications    Medication Sig Taking? Authorizing Provider   vitamin D (CHOLECALCIFEROL) 25 MCG (1000 UT) TABS tablet Take 1 tablet by mouth daily Yes Emil Guan MD   atorvastatin (LIPITOR) 40 MG tablet Take 1 tablet by mouth daily To the patient: Please call to make an appointment for fasting labs.  Phone: [phone].  Emil Guan MD   lisinopril (PRINIVIL;ZESTRIL) 10 MG tablet Take 1 tablet by mouth daily To the patient: Please call to make an appointment for fasting

## 2025-06-02 RX ORDER — MULTIVIT-MIN/IRON/FOLIC ACID/K 18-600-40
1 CAPSULE ORAL DAILY
Qty: 90 TABLET | Refills: 0 | OUTPATIENT
Start: 2025-06-02

## 2025-06-23 ENCOUNTER — OFFICE VISIT (OUTPATIENT)
Dept: PRIMARY CARE CLINIC | Age: 60
End: 2025-06-23
Payer: COMMERCIAL

## 2025-06-23 VITALS
WEIGHT: 193 LBS | DIASTOLIC BLOOD PRESSURE: 76 MMHG | HEART RATE: 78 BPM | BODY MASS INDEX: 31.02 KG/M2 | TEMPERATURE: 98.6 F | SYSTOLIC BLOOD PRESSURE: 135 MMHG | HEIGHT: 66 IN | OXYGEN SATURATION: 96 %

## 2025-06-23 DIAGNOSIS — Z12.11 SCREEN FOR COLON CANCER: ICD-10-CM

## 2025-06-23 DIAGNOSIS — Z00.00 ENCOUNTER FOR WELL ADULT EXAM WITHOUT ABNORMAL FINDINGS: ICD-10-CM

## 2025-06-23 DIAGNOSIS — E78.5 HYPERLIPIDEMIA LDL GOAL <100: ICD-10-CM

## 2025-06-23 DIAGNOSIS — Z72.0 NICOTINE ABUSE: ICD-10-CM

## 2025-06-23 DIAGNOSIS — Z87.891 PERSONAL HISTORY OF TOBACCO USE: ICD-10-CM

## 2025-06-23 DIAGNOSIS — E11.9 TYPE 2 DIABETES MELLITUS WITHOUT COMPLICATION, WITHOUT LONG-TERM CURRENT USE OF INSULIN (HCC): Primary | ICD-10-CM

## 2025-06-23 DIAGNOSIS — Z00.00 PREVENTATIVE HEALTH CARE: ICD-10-CM

## 2025-06-23 DIAGNOSIS — I10 ESSENTIAL HYPERTENSION: ICD-10-CM

## 2025-06-23 PROCEDURE — 3078F DIAST BP <80 MM HG: CPT | Performed by: FAMILY MEDICINE

## 2025-06-23 PROCEDURE — 36415 COLL VENOUS BLD VENIPUNCTURE: CPT | Performed by: FAMILY MEDICINE

## 2025-06-23 PROCEDURE — 99214 OFFICE O/P EST MOD 30 MIN: CPT | Performed by: FAMILY MEDICINE

## 2025-06-23 PROCEDURE — G0296 VISIT TO DETERM LDCT ELIG: HCPCS | Performed by: FAMILY MEDICINE

## 2025-06-23 PROCEDURE — 3075F SYST BP GE 130 - 139MM HG: CPT | Performed by: FAMILY MEDICINE

## 2025-06-23 RX ORDER — AMLODIPINE BESYLATE 10 MG/1
10 TABLET ORAL DAILY
Qty: 90 TABLET | Refills: 1 | Status: SHIPPED | OUTPATIENT
Start: 2025-06-23

## 2025-06-23 RX ORDER — ASPIRIN 81 MG/1
81 TABLET, COATED ORAL DAILY
Qty: 90 TABLET | Refills: 1 | Status: SHIPPED | OUTPATIENT
Start: 2025-06-23

## 2025-06-23 RX ORDER — LISINOPRIL 10 MG/1
10 TABLET ORAL DAILY
Qty: 90 TABLET | Refills: 0 | Status: SHIPPED | OUTPATIENT
Start: 2025-06-23

## 2025-06-23 RX ORDER — VARENICLINE TARTRATE 1 MG/1
1 TABLET, FILM COATED ORAL 2 TIMES DAILY
Qty: 60 TABLET | Refills: 5 | Status: SHIPPED | OUTPATIENT
Start: 2025-06-23

## 2025-06-23 RX ORDER — PIOGLITAZONE 30 MG/1
30 TABLET ORAL DAILY
Qty: 90 TABLET | Refills: 1 | Status: SHIPPED | OUTPATIENT
Start: 2025-06-23

## 2025-06-23 RX ORDER — ATORVASTATIN CALCIUM 40 MG/1
40 TABLET, FILM COATED ORAL DAILY
Qty: 90 TABLET | Refills: 0 | Status: SHIPPED | OUTPATIENT
Start: 2025-06-23

## 2025-06-23 SDOH — ECONOMIC STABILITY: FOOD INSECURITY: WITHIN THE PAST 12 MONTHS, THE FOOD YOU BOUGHT JUST DIDN'T LAST AND YOU DIDN'T HAVE MONEY TO GET MORE.: SOMETIMES TRUE

## 2025-06-23 SDOH — ECONOMIC STABILITY: FOOD INSECURITY: WITHIN THE PAST 12 MONTHS, YOU WORRIED THAT YOUR FOOD WOULD RUN OUT BEFORE YOU GOT MONEY TO BUY MORE.: SOMETIMES TRUE

## 2025-06-23 ASSESSMENT — ENCOUNTER SYMPTOMS
BLOOD IN STOOL: 0
VOICE CHANGE: 0
SHORTNESS OF BREATH: 0
TROUBLE SWALLOWING: 0
DIARRHEA: 0
CONSTIPATION: 0
ABDOMINAL PAIN: 0

## 2025-06-23 ASSESSMENT — PATIENT HEALTH QUESTIONNAIRE - PHQ9
2. FEELING DOWN, DEPRESSED OR HOPELESS: NOT AT ALL
SUM OF ALL RESPONSES TO PHQ QUESTIONS 1-9: 0
1. LITTLE INTEREST OR PLEASURE IN DOING THINGS: NOT AT ALL

## 2025-06-23 NOTE — PATIENT INSTRUCTIONS
utility assistance     Phone Number: [phone]  Address: [address] [place] [address]   Website:      [facility]  What they offer:  Rent, utility assistance and financial assistance   Phone Number: 463-625-6530  Address: [address] [place] [address]   Website: [email or web address]://[email or web address]/     [facility] [employer]  [place]   What they offer:  Food, rent, utility and financial assistance   Phone Number: 258-764-8365  Address: [address], [address]   Website: https://www.[employer]/[place]/default.htm        [facility]  What they offer:  Rent and utility assistance     Phone Number: 713-444-0863  Address: [address] [address]    Website: http://www.[place]/     [facility] [employer]  What they offer:  Food, rent and utility assistance     Phone Number: 751-474-0377  [address] [place] [address]   Website: https://[facility]/     [facility] [employer]  What they offer:  Food, rent and utility assistance     Phone Number: 231-101-5203  Address: [address] [place] [place] [address]   Website: [email or web address]     [facility]   What they offer:  Rent and utility assistance and [place] [facility] waiver    Phone Number: 384-163-1905  Address: [address] [id number]   Website: https://www.[place]./      [facility]  What they offer:  Rent and utility assistance and [place] [facility] waiver     Phone Number: 981-865-4382  Address: [address] [id number]   Website: https://[email or web address].org/sitepages/HOME.html       Emergency Rental Assistance:   [employer] on Homelessness and Housing in [place]: Cite provides list of Community Resources that assist with emergency rental assistance for all counties in [place]  https://cohhio.org/        How to apply for Medicaid or Health Insurance   Online:  Apply online at:

## 2025-06-23 NOTE — PROGRESS NOTES
2025     Chris Hook (:  1965) is a 60 y.o. male, here for evaluation of the following medical concerns:    HPI  Patient is 60 years old male presented to the office for follow-up.  He now works as over .  His medical history is significant for diabetes takes metformin and Lantus, hypertension controlled with amlodipine and lisinopril, nicotine abuse still smoke AGAINST MEDICAL ADVICE.  He presented to the office for regular follow-up and referral she also requested a blood test    Review of Systems   Constitutional:  Negative for activity change.   HENT:  Negative for trouble swallowing and voice change.    Eyes:  Negative for visual disturbance.   Respiratory:  Negative for shortness of breath.    Cardiovascular:  Negative for chest pain and leg swelling.   Gastrointestinal:  Negative for abdominal pain, blood in stool, constipation and diarrhea.   Genitourinary:  Negative for difficulty urinating, dysuria, frequency, hematuria and scrotal swelling.   Musculoskeletal:  Negative for arthralgias and myalgias.   Skin:  Negative for rash.   Neurological:  Negative for dizziness.   Psychiatric/Behavioral:  Negative for behavioral problems.        Prior to Visit Medications    Medication Sig Taking? Authorizing Provider   vitamin D (CHOLECALCIFEROL) 25 MCG (1000 UT) TABS tablet Take 1 tablet by mouth daily Yes Emil Guan MD   varenicline (CHANTIX) 1 MG tablet Take 1 tablet by mouth 2 times daily Yes Emil Guan MD   pioglitazone (ACTOS) 30 MG tablet Take 1 tablet by mouth daily Yes Emil Guan MD   lisinopril (PRINIVIL;ZESTRIL) 10 MG tablet Take 1 tablet by mouth daily To the patient: Please call to make an appointment for fasting labs.  Phone: [phone]. Yes Emil Guan MD   atorvastatin (LIPITOR) 40 MG tablet Take 1 tablet by mouth daily To the patient: Please call to make an appointment for fasting labs.  Phone: [phone]. Yes Emil Guan MD   aspirin

## 2025-06-27 LAB
ALBUMIN SERPL-MCNC: 4.3 G/DL (ref 3.4–5)
ALBUMIN/GLOB SERPL: 2 {RATIO} (ref 1.1–2.2)
ALP SERPL-CCNC: 83 U/L (ref 40–129)
ALT SERPL-CCNC: 33 U/L (ref 10–40)
ANION GAP SERPL CALCULATED.3IONS-SCNC: 11 MMOL/L (ref 3–16)
AST SERPL-CCNC: 19 U/L (ref 15–37)
BASOPHILS # BLD: 0 K/UL (ref 0–0.2)
BASOPHILS NFR BLD: 1 %
BILIRUB SERPL-MCNC: 0.3 MG/DL (ref 0–1)
BUN SERPL-MCNC: 11 MG/DL (ref 7–20)
CALCIUM SERPL-MCNC: 9 MG/DL (ref 8.3–10.6)
CHLORIDE SERPL-SCNC: 106 MMOL/L (ref 99–110)
CHOLEST SERPL-MCNC: 159 MG/DL (ref 0–199)
CO2 SERPL-SCNC: 25 MMOL/L (ref 21–32)
CREAT SERPL-MCNC: 1 MG/DL (ref 0.9–1.3)
DEPRECATED RDW RBC AUTO: 14.9 % (ref 12.4–15.4)
EOSINOPHIL # BLD: 0 K/UL (ref 0–0.6)
EOSINOPHIL NFR BLD: 0 %
GFR SERPLBLD CREATININE-BSD FMLA CKD-EPI: 87 ML/MIN/{1.73_M2}
GLUCOSE SERPL-MCNC: 133 MG/DL (ref 70–99)
HCT VFR BLD AUTO: 46.4 % (ref 40.5–52.5)
HDLC SERPL-MCNC: 54 MG/DL (ref 40–60)
HGB BLD-MCNC: 15.4 G/DL (ref 13.5–17.5)
LDLC SERPL CALC-MCNC: 89 MG/DL
LYMPHOCYTES # BLD: 1.8 K/UL (ref 1–5.1)
LYMPHOCYTES NFR BLD: 47 %
MCH RBC QN AUTO: 31.4 PG (ref 26–34)
MCHC RBC AUTO-ENTMCNC: 33.2 G/DL (ref 31–36)
MCV RBC AUTO: 94.6 FL (ref 80–100)
MONOCYTES # BLD: 0.5 K/UL (ref 0–1.3)
MONOCYTES NFR BLD: 14 %
NEUTROPHILS # BLD: 1.5 K/UL (ref 1.7–7.7)
NEUTROPHILS NFR BLD: 38 %
PLATELET # BLD AUTO: 223 K/UL (ref 135–450)
PMV BLD AUTO: 8.4 FL (ref 5–10.5)
POTASSIUM SERPL-SCNC: 4.2 MMOL/L (ref 3.5–5.1)
PROT SERPL-MCNC: 6.5 G/DL (ref 6.4–8.2)
PSA SERPL DL<=0.01 NG/ML-MCNC: 1.32 NG/ML (ref 0–4)
RBC # BLD AUTO: 4.91 M/UL (ref 4.2–5.9)
RBC MORPH BLD: NORMAL
SLIDE REVIEW: ABNORMAL
SODIUM SERPL-SCNC: 142 MMOL/L (ref 136–145)
TRIGL SERPL-MCNC: 80 MG/DL (ref 0–150)
TSH SERPL DL<=0.005 MIU/L-ACNC: 1.38 UIU/ML (ref 0.27–4.2)
VLDLC SERPL CALC-MCNC: 16 MG/DL
WBC # BLD AUTO: 3.9 K/UL (ref 4–11)

## 2025-06-28 LAB
EST. AVERAGE GLUCOSE BLD GHB EST-MCNC: 139.9 MG/DL
HBA1C MFR BLD: 6.5 %

## 2025-07-05 LAB — NONINV COLON CA DNA+OCC BLD SCRN STL QL: POSITIVE

## 2025-07-08 ENCOUNTER — HOSPITAL ENCOUNTER (OUTPATIENT)
Dept: CT IMAGING | Age: 60
Discharge: HOME | End: 2025-07-08
Payer: COMMERCIAL

## 2025-07-08 DIAGNOSIS — Z72.0 NICOTINE ABUSE: ICD-10-CM

## 2025-07-08 PROCEDURE — 71271 CT THORAX LUNG CANCER SCR C-: CPT

## 2025-09-02 ENCOUNTER — OFFICE VISIT (OUTPATIENT)
Dept: PRIMARY CARE CLINIC | Age: 60
End: 2025-09-02
Payer: COMMERCIAL

## 2025-09-02 VITALS
DIASTOLIC BLOOD PRESSURE: 73 MMHG | SYSTOLIC BLOOD PRESSURE: 125 MMHG | HEART RATE: 62 BPM | HEIGHT: 66 IN | OXYGEN SATURATION: 98 % | TEMPERATURE: 97.9 F | BODY MASS INDEX: 31.5 KG/M2 | WEIGHT: 196 LBS

## 2025-09-02 DIAGNOSIS — E11.9 TYPE 2 DIABETES MELLITUS WITHOUT COMPLICATION, WITHOUT LONG-TERM CURRENT USE OF INSULIN (HCC): Primary | ICD-10-CM

## 2025-09-02 DIAGNOSIS — I10 ESSENTIAL HYPERTENSION: ICD-10-CM

## 2025-09-02 DIAGNOSIS — M79.672 PAIN IN BOTH FEET: ICD-10-CM

## 2025-09-02 DIAGNOSIS — R19.5 HEME POSITIVE STOOL: ICD-10-CM

## 2025-09-02 DIAGNOSIS — E78.5 HYPERLIPIDEMIA LDL GOAL <100: ICD-10-CM

## 2025-09-02 DIAGNOSIS — Z72.0 NICOTINE ABUSE: ICD-10-CM

## 2025-09-02 DIAGNOSIS — M79.671 PAIN IN BOTH FEET: ICD-10-CM

## 2025-09-02 PROCEDURE — 3074F SYST BP LT 130 MM HG: CPT | Performed by: FAMILY MEDICINE

## 2025-09-02 PROCEDURE — 3078F DIAST BP <80 MM HG: CPT | Performed by: FAMILY MEDICINE

## 2025-09-02 PROCEDURE — 3044F HG A1C LEVEL LT 7.0%: CPT | Performed by: FAMILY MEDICINE

## 2025-09-02 PROCEDURE — 99214 OFFICE O/P EST MOD 30 MIN: CPT | Performed by: FAMILY MEDICINE

## 2025-09-02 RX ORDER — PIOGLITAZONE 30 MG/1
30 TABLET ORAL DAILY
Qty: 90 TABLET | Refills: 1 | Status: SHIPPED | OUTPATIENT
Start: 2025-09-02

## 2025-09-02 RX ORDER — LISINOPRIL 10 MG/1
10 TABLET ORAL DAILY
Qty: 90 TABLET | Refills: 0 | Status: SHIPPED | OUTPATIENT
Start: 2025-09-02

## 2025-09-02 RX ORDER — VARENICLINE TARTRATE 1 MG/1
1 TABLET, FILM COATED ORAL 2 TIMES DAILY
Qty: 60 TABLET | Refills: 5 | Status: SHIPPED | OUTPATIENT
Start: 2025-09-02

## 2025-09-02 RX ORDER — AMLODIPINE BESYLATE 10 MG/1
10 TABLET ORAL DAILY
Qty: 90 TABLET | Refills: 1 | Status: SHIPPED | OUTPATIENT
Start: 2025-09-02

## 2025-09-02 RX ORDER — ASPIRIN 81 MG/1
81 TABLET, COATED ORAL DAILY
Qty: 90 TABLET | Refills: 1 | Status: SHIPPED | OUTPATIENT
Start: 2025-09-02

## 2025-09-02 RX ORDER — METFORMIN HYDROCHLORIDE 500 MG/1
500 TABLET, EXTENDED RELEASE ORAL
Qty: 90 TABLET | Refills: 0 | Status: SHIPPED | OUTPATIENT
Start: 2025-09-02

## 2025-09-02 RX ORDER — ATORVASTATIN CALCIUM 40 MG/1
40 TABLET, FILM COATED ORAL DAILY
Qty: 90 TABLET | Refills: 0 | Status: SHIPPED | OUTPATIENT
Start: 2025-09-02

## 2025-09-02 SDOH — ECONOMIC STABILITY: FOOD INSECURITY: WITHIN THE PAST 12 MONTHS, YOU WORRIED THAT YOUR FOOD WOULD RUN OUT BEFORE YOU GOT MONEY TO BUY MORE.: NEVER TRUE

## 2025-09-02 SDOH — ECONOMIC STABILITY: FOOD INSECURITY: WITHIN THE PAST 12 MONTHS, THE FOOD YOU BOUGHT JUST DIDN'T LAST AND YOU DIDN'T HAVE MONEY TO GET MORE.: NEVER TRUE

## 2025-09-02 ASSESSMENT — ENCOUNTER SYMPTOMS
BLOOD IN STOOL: 0
VOICE CHANGE: 0
TROUBLE SWALLOWING: 0
DIARRHEA: 0
SHORTNESS OF BREATH: 0
ABDOMINAL PAIN: 0
CONSTIPATION: 0

## 2025-09-02 ASSESSMENT — PATIENT HEALTH QUESTIONNAIRE - PHQ9
SUM OF ALL RESPONSES TO PHQ QUESTIONS 1-9: 0
1. LITTLE INTEREST OR PLEASURE IN DOING THINGS: NOT AT ALL
SUM OF ALL RESPONSES TO PHQ QUESTIONS 1-9: 0
SUM OF ALL RESPONSES TO PHQ QUESTIONS 1-9: 0
2. FEELING DOWN, DEPRESSED OR HOPELESS: NOT AT ALL
SUM OF ALL RESPONSES TO PHQ QUESTIONS 1-9: 0

## (undated) DEVICE — DRAPE C ARM UNIV W41XL74IN CLR PLAS XR VELC CLSR POLY STRP

## (undated) DEVICE — TRAY PREP DRY W/ PREM GLV 2 APPL 6 SPNG 2 UNDPD 1 OVERWRAP

## (undated) DEVICE — GOWN,AURORA,NONREINFORCED,LARGE: Brand: MEDLINE

## (undated) DEVICE — MAJOR SET UP PK

## (undated) DEVICE — K-WIRE: Brand: ASNIS

## (undated) DEVICE — CANNULATED SCREWDRIVER

## (undated) DEVICE — CANNULATED DRILL, AO: Brand: ASNIS

## (undated) DEVICE — T-DRAPE,EXTREMITY,STERILE: Brand: MEDLINE

## (undated) DEVICE — INTENDED FOR TISSUE SEPARATION, AND OTHER PROCEDURES THAT REQUIRE A SHARP SURGICAL BLADE TO PUNCTURE OR CUT.: Brand: BARD-PARKER ® STAINLESS STEEL BLADES

## (undated) DEVICE — CANNULATED COUNTERSINK: Brand: ASNIS

## (undated) DEVICE — MICRO SAGITTAL BLADE (9.5 X 0.4 X 25.5MM)

## (undated) DEVICE — SUTURE ETHLN SZ 3-0 L18IN NONABSORBABLE BLK PS-2 L19MM 3/8 1669H

## (undated) DEVICE — MASC TURNOVER KIT: Brand: MEDLINE INDUSTRIES, INC.

## (undated) DEVICE — TOWEL,OR,DSP,ST,BLUE,STD,4/PK,20PK/CS: Brand: MEDLINE

## (undated) DEVICE — GLOVE SURG SZ 9 THK91MIL LTX FREE SYN POLYISOPRENE ANTI

## (undated) DEVICE — GOWN,AURORA,NONREINF,RAGLAN,XXL,STERILE: Brand: MEDLINE

## (undated) DEVICE — SUTURE VCRL SZ 2-0 L18IN ABSRB UD POLYGLACTIN 910 BRAID TIE J111T

## (undated) DEVICE — Device

## (undated) DEVICE — PADDING CAST W4INXL4YD ST COT RAYON MICROPLEATED HIGHLY

## (undated) DEVICE — GLOVE ORANGE PI 8 1/2   MSG9085

## (undated) DEVICE — BANDAGE COMPR W4INXL12FT E DISP ESMARCH EVEN

## (undated) DEVICE — SHEET,DRAPE,53X77,STERILE: Brand: MEDLINE

## (undated) DEVICE — SOLUTION IV IRRIG 500ML 0.9% SODIUM CHL 2F7123

## (undated) DEVICE — 3M™ COBAN™ NL STERILE NON-LATEX SELF-ADHERENT WRAP, 2084S, 4 IN X 5 YD (10 CM X 4,5 M), 18 ROLLS/CASE: Brand: 3M™ COBAN™

## (undated) DEVICE — ELECTRODE ELECSURG NDL 2.8 INX7.2 CM COAT INSUL EDGE

## (undated) DEVICE — HOLDING PIN: Brand: ANCHORAGE

## (undated) DEVICE — SUTURE ETHLN SZ 4-0 L18IN NONABSORBABLE BLK L19MM PS-2 3/8 1667H

## (undated) DEVICE — DRILL, AO, SCALED: Brand: VARIAX

## (undated) DEVICE — CURITY STRETCH BANDAGE: Brand: CURITY

## (undated) DEVICE — IMPL KWIRE 1.25X150MM
Type: IMPLANTABLE DEVICE | Site: FOOT | Status: NON-FUNCTIONAL
Removed: 2021-01-22

## (undated) DEVICE — GAUZE,SPONGE,4"X4",8PLY,STRL,LF,10/TRAY: Brand: MEDLINE

## (undated) DEVICE — COVER,MAYO STAND,STERILE: Brand: MEDLINE

## (undated) DEVICE — RASP SURG L W0.27XL0.55IN TEAR CRSS CUT MIC RECIP SAW

## (undated) DEVICE — SUTURE VCRL SZ 3-0 L18IN ABSRB UD PS-2 L19MM 3/8 CRV PRIM J497H

## (undated) DEVICE — STOCKINETTE CAST W6XL48IN WHT POLY IMPERV PRECUT SYN DBL

## (undated) DEVICE — DRESSING PETRO W3XL3IN OIL EMUL N ADH GZ KNIT IMPREG CELOS

## (undated) DEVICE — SYRINGE, LUER LOCK, 10ML: Brand: MEDLINE

## (undated) DEVICE — SCREW LK T6 2.0X10MM
Type: IMPLANTABLE DEVICE | Site: FOOT | Status: NON-FUNCTIONAL
Removed: 2021-01-22